# Patient Record
Sex: FEMALE | Race: OTHER | Employment: UNEMPLOYED | URBAN - METROPOLITAN AREA
[De-identification: names, ages, dates, MRNs, and addresses within clinical notes are randomized per-mention and may not be internally consistent; named-entity substitution may affect disease eponyms.]

---

## 2019-09-13 ENCOUNTER — INITIAL PRENATAL (OUTPATIENT)
Dept: FAMILY MEDICINE CLINIC | Age: 24
End: 2019-09-13

## 2019-09-13 ENCOUNTER — HOSPITAL ENCOUNTER (OUTPATIENT)
Dept: LAB | Age: 24
Discharge: HOME | End: 2019-09-13
Payer: SUBSIDIZED

## 2019-09-13 VITALS
WEIGHT: 182 LBS | HEART RATE: 94 BPM | TEMPERATURE: 98.2 F | DIASTOLIC BLOOD PRESSURE: 69 MMHG | HEIGHT: 61 IN | OXYGEN SATURATION: 98 % | SYSTOLIC BLOOD PRESSURE: 106 MMHG | RESPIRATION RATE: 16 BRPM | BODY MASS INDEX: 34.36 KG/M2

## 2019-09-13 DIAGNOSIS — Z34.90 ENCOUNTER FOR SUPERVISION OF NORMAL PREGNANCY, ANTEPARTUM, UNSPECIFIED GRAVIDITY: ICD-10-CM

## 2019-09-13 DIAGNOSIS — Z34.90 PREGNANCY, UNSPECIFIED GESTATIONAL AGE: Primary | ICD-10-CM

## 2019-09-13 LAB
ANTIBODY SCREEN, EXTERNAL: NEGATIVE
BILIRUB UR QL STRIP: NEGATIVE
CHLAMYDIA, EXTERNAL: NEGATIVE
GLUCOSE UR-MCNC: NEGATIVE MG/DL
HBSAG, EXTERNAL: NEGATIVE
HIV, EXTERNAL: NORMAL
KETONES P FAST UR STRIP-MCNC: NORMAL MG/DL
N. GONORRHEA, EXTERNAL: NEGATIVE
PH UR STRIP: 6.5 [PH] (ref 4.6–8)
PROT UR QL STRIP: NORMAL
RPR, EXTERNAL: NORMAL
RUBELLA, EXTERNAL: NORMAL
SP GR UR STRIP: 1.02 (ref 1–1.03)
TYPE, ABO & RH, EXTERNAL: NORMAL
UA UROBILINOGEN AMB POC: NORMAL (ref 0.2–1)
URINALYSIS CLARITY POC: NORMAL
URINALYSIS COLOR POC: YELLOW
URINE BLOOD POC: NEGATIVE
URINE LEUKOCYTES POC: NORMAL
URINE NITRITES POC: POSITIVE

## 2019-09-13 PROCEDURE — 88175 CYTOPATH C/V AUTO FLUID REDO: CPT

## 2019-09-13 PROCEDURE — 87491 CHLMYD TRACH DNA AMP PROBE: CPT

## 2019-09-13 NOTE — PROGRESS NOTES
History and Physical    Patient: Hira Mahoney MRN: [id number]  SSN: [id number]    YOB: 1995  Age: 25 y.o. Sex: female      Subjective:      Hira Mahoney is a 25 y.o. female  at Unknown who presents for [address] visit. FOB involved, they live together    States she is late to care because she didn't realize she was pregnant. Was on OCPs at the time but had missed some doses. Children live with her, same FOB for all her children. Was not planning to have another baby but is excited about it. Not taking PNV  States she's been feeling the baby more \"for 3 months\"    Past Medical History:   Diagnosis Date    Asthma     mild intermittent      Past Surgical History:   Procedure Laterality Date    HX  SECTION        History reviewed. No pertinent family history. Social History     Tobacco Use    Smoking status: Never Smoker   Substance Use Topics    Alcohol use: Not Currently      Prior to Admission medications    Not on File        Not on File    Review of Systems:  ROS negative except as noted in HPI. Objective:     Vitals:    19 1300   Resp: 16   Weight: 182 lb (82.6 kg)   Height: 5' 1.02\" (1.55 m)        Physical Exam:  See prenatal physical exam.    Assessment/Plan:   23yo  @ 23w1d by LMP  1. IUP: IOB labs and pap today, will defer dating due to late GA and schedule anatomy. Measuring 32wk. Declined genetic screening. 2.  Late to care: established at 23 wk   3. Hx : x2, will try to get records from last section, will be repeat at 39wk  4.   Asthma: mild intermittent, hasn't had to use inhaler for years     Signed By: Jenna Kamara DO     2019

## 2019-09-14 LAB
ABO GROUP BLD: NORMAL
RH BLD: POSITIVE

## 2019-09-15 LAB
BACTERIA UR CULT: NORMAL
BACTERIA UR CULT: NORMAL

## 2019-09-16 LAB
BASOPHILS # BLD AUTO: 0 X10E3/UL (ref 0–0.2)
BASOPHILS NFR BLD AUTO: 0 %
BLD GP AB SCN SERPL QL: NEGATIVE
EOSINOPHIL # BLD AUTO: 0.1 X10E3/UL (ref 0–0.4)
EOSINOPHIL NFR BLD AUTO: 2 %
ERYTHROCYTE [DISTWIDTH] IN BLOOD BY AUTOMATED COUNT: 14.5 % (ref 12.3–15.4)
EST. AVERAGE GLUCOSE BLD GHB EST-MCNC: 103 MG/DL
HBA1C MFR BLD: 5.2 % (ref 4.8–5.6)
HBV SURFACE AG SERPL QL IA: NEGATIVE
HCT VFR BLD AUTO: 29.6 % (ref 34–46.6)
HGB A MFR BLD: 97.9 % (ref 96.4–98.8)
HGB A2 MFR BLD COLUMN CHROM: 2.1 % (ref 1.8–3.2)
HGB BLD-MCNC: 9.7 G/DL (ref 11.1–15.9)
HGB C MFR BLD: 0 %
HGB F MFR BLD: 0 % (ref 0–2)
HGB FRACT BLD-IMP: NORMAL
HGB OTHER MFR BLD HPLC: 0 %
HGB S BLD QL SOLY: NEGATIVE
HGB S MFR BLD: 0 %
HIV 1+2 AB+HIV1 P24 AG SERPL QL IA: NON REACTIVE
IMM GRANULOCYTES # BLD AUTO: 0.1 X10E3/UL (ref 0–0.1)
IMM GRANULOCYTES NFR BLD AUTO: 1 %
LYMPHOCYTES # BLD AUTO: 1.4 X10E3/UL (ref 0.7–3.1)
LYMPHOCYTES NFR BLD AUTO: 18 %
MCH RBC QN AUTO: 26.1 PG (ref 26.6–33)
MCHC RBC AUTO-ENTMCNC: 32.8 G/DL (ref 31.5–35.7)
MCV RBC AUTO: 80 FL (ref 79–97)
MONOCYTES # BLD AUTO: 0.5 X10E3/UL (ref 0.1–0.9)
MONOCYTES NFR BLD AUTO: 6 %
NEUTROPHILS # BLD AUTO: 6 X10E3/UL (ref 1.4–7)
NEUTROPHILS NFR BLD AUTO: 73 %
PLATELET # BLD AUTO: 246 X10E3/UL (ref 150–450)
RBC # BLD AUTO: 3.72 X10E6/UL (ref 3.77–5.28)
RPR SER QL: NON REACTIVE
RUBV IGG SERPL IA-ACNC: 2.69 INDEX
VZV IGG SER IA-ACNC: 1584 INDEX
WBC # BLD AUTO: 8.1 X10E3/UL (ref 3.4–10.8)

## 2019-09-17 ENCOUNTER — TELEPHONE (OUTPATIENT)
Dept: FAMILY MEDICINE CLINIC | Age: 24
End: 2019-09-17

## 2019-09-17 DIAGNOSIS — Z34.90 PREGNANCY, UNSPECIFIED GESTATIONAL AGE: Primary | ICD-10-CM

## 2019-09-17 LAB
C TRACH RRNA SPEC QL NAA+PROBE: NEGATIVE
N GONORRHOEA RRNA SPEC QL NAA+PROBE: NEGATIVE
SPECIMEN SOURCE: NORMAL

## 2019-09-17 RX ORDER — LANOLIN ALCOHOL/MO/W.PET/CERES
325 CREAM (GRAM) TOPICAL
Qty: 90 TAB | Refills: 6 | Status: SHIPPED | OUTPATIENT
Start: 2019-09-17

## 2019-09-17 NOTE — TELEPHONE ENCOUNTER
Called patient using [employer]  [phone] to inform her of lab results and prescription for iron sent to pharmacy. Patient verbalized understanding.

## 2019-09-17 NOTE — PROGRESS NOTES
RH pos  Started iron for anemia  Sent iron profile+ferritin   Consider tx of persistent lactobacillus if remains high colony count

## 2019-09-19 LAB
FERRITIN SERPL-MCNC: 5 NG/ML (ref 15–150)
IRON SATN MFR SERPL: 6 % (ref 15–55)
IRON SERPL-MCNC: 33 UG/DL (ref 27–159)
SPECIMEN STATUS REPORT, ROLRST: NORMAL
TIBC SERPL-MCNC: 535 UG/DL (ref 250–450)
UIBC SERPL-MCNC: 502 UG/DL (ref 131–425)

## 2019-09-24 ENCOUNTER — HOSPITAL ENCOUNTER (OUTPATIENT)
Dept: PERINATAL CARE | Age: 24
Discharge: HOME | End: 2019-09-24
Attending: OBSTETRICS & GYNECOLOGY
Payer: SUBSIDIZED

## 2019-09-24 PROCEDURE — 76805 OB US >/= 14 WKS SNGL FETUS: CPT | Performed by: OBSTETRICS & GYNECOLOGY

## 2019-10-01 NOTE — PROGRESS NOTES
12/9/19 ([facility] 39 changed by MFM and changed in chart)  Hx c/s x2, placenta anterior   Anatomy normal   Late to care.

## 2019-10-25 ENCOUNTER — ROUTINE PRENATAL (OUTPATIENT)
Dept: FAMILY MEDICINE CLINIC | Age: 24
End: 2019-10-25

## 2019-10-25 VITALS
HEIGHT: 61 IN | DIASTOLIC BLOOD PRESSURE: 73 MMHG | TEMPERATURE: 98.1 F | SYSTOLIC BLOOD PRESSURE: 111 MMHG | RESPIRATION RATE: 16 BRPM | HEART RATE: 77 BPM | BODY MASS INDEX: 34.78 KG/M2 | OXYGEN SATURATION: 99 % | WEIGHT: 184.2 LBS

## 2019-10-25 DIAGNOSIS — Z3A.33 33 WEEKS GESTATION OF PREGNANCY: Primary | ICD-10-CM

## 2019-10-25 LAB
BILIRUB UR QL STRIP: NORMAL
GLUCOSE UR-MCNC: NEGATIVE MG/DL
KETONES P FAST UR STRIP-MCNC: NORMAL MG/DL
PH UR STRIP: 5.5 [PH] (ref 4.6–8)
PROT UR QL STRIP: NORMAL
SP GR UR STRIP: 1.03 (ref 1–1.03)
UA UROBILINOGEN AMB POC: NORMAL (ref 0.2–1)
URINALYSIS CLARITY POC: NORMAL
URINALYSIS COLOR POC: YELLOW
URINE BLOOD POC: NEGATIVE
URINE LEUKOCYTES POC: NORMAL
URINE NITRITES POC: NEGATIVE

## 2019-10-25 NOTE — PROGRESS NOTES
RETURN OB VISIT     Subjective:   Timothy Holder is a G5  who presents at 33w4d for routine prenatal visit. She feels well and is without complaints. She is eating a balanced diet. She is taking her prenatal vitamins. She is taking iron tid with or without food. No constipation or SEs  She is drinking 8+ cups of water daily. She is feeling her baby move. She denies vaginal bleeding, discharge or loss of fluid. She denies nausea, vomiting, severe abdominal pain or cramping. She denies dysuria. Current Outpatient Medications on File Prior to Visit   Medication Sig Dispense Refill    ferrous sulfate 325 mg (65 mg iron) tablet Take 1 Tab by mouth three (3) times daily (with meals). 90 Tab 6     No current facility-administered medications on file prior to visit. Objective:     Visit Vitals  /73   Pulse 77   Temp 98.1 °F (36.7 °C) (Oral)   Resp 16   Ht 5' 1.02\" (1.55 m)   Wt 184 lb 3.2 oz (83.6 kg)   LMP 04/04/2019   SpO2 99%   BMI 34.78 kg/m²     Physical Exam:  GENERAL APPEARANCE: alert, well appearing, in no apparent distress, well hydrated  HEAD: normocephalic, atraumatic  Lungs:  No respiratory distress  HEART: regular rate and rhythm, no murmurs  ABDOMEN: soft, nontender,Fundus soft, nontender and measuring 33 cm.  FHT present @ 135 bpm.  BACK: no CVA tenderness  EXTREMITIES: no redness or tenderness in the calves or thighs, no edema  NEUROLOGICAL: alert, oriented, normal speech, no focal findings or movement disorder noted  SKIN: normal coloration and turgor, no rashes    Labs:  Results for orders placed or performed in visit on 10/25/19   AMB POC URINALYSIS DIP STICK AUTO W/O MICRO     Status: None   Result Value Ref Range Status    Color (UA POC) Yellow  Final    Clarity (UA POC) Slightly Cloudy  Final    Glucose (UA POC) Negative Negative Final    Bilirubin (UA POC) 1+ Negative Final    Ketones (UA POC) 1+ Negative Final    Specific gravity (UA POC) 1.030 1.001 - 1.035 Final    Blood (UA POC) Negative Negative Final    pH (UA POC) 5.5 4.6 - 8.0 Final    Protein (UA POC) 1+ Negative Final    Urobilinogen (UA POC) 0.2 mg/dL 0.2 - 1 Final    Nitrites (UA POC) Negative Negative Final    Leukocyte esterase (UA POC) Trace Negative Final         Assessment/ Plan:   Perez Cantor is a 25 y.o.  at 33w4d by 29w1d sono not c/w LMP with Estimated Date of Delivery: 19. Complicated by: Late presentation to [facility], prior CD x 2, h/o asthma  Blood type O +  PNLs wnl  Pt declined genetic testing  Pap NILM (2019)  Gc/Chlam neg  UA with 1+ bili, ketones, protein - suspect dehydration and discussed importance of drinking 8-10+ cups of water daily    1. IUP: Presented too late to [facility], dated by 3rd trimester US with TELMA 19. Declined genetic screening. 1hr GTT and fu CBC ordered, pt will return for lab appt on Mon 10/28. Tdap given today. GBS screening at next appt. 2. H/o  x 2: Plan for repeat c/s at 39 weeks. Will try to obtain records of prior deliveries. 3. H/o asthma: stable, no inhaler use in years    Diagnoses and all orders for this visit:    1. 33 weeks gestation of pregnancy  -     AMB POC URINALYSIS DIP STICK AUTO W/O MICRO  -     TETANUS, DIPHTHERIA TOXOIDS AND ACELLULAR PERTUSSIS VACCINE (TDAP), IN INDIVIDS. >=7, IM  -     GLUCOSE, GESTATIONAL 1 HR TOLERANCE; Future  -     CBC WITH AUTOMATED DIFF       Follow-up and Dispositions    · Return in about 2 weeks (around 2019) for Routine PNC (and lab appointment on 10/28/19). I have discussed the diagnosis with the patient and the intended plan as seen in the above orders. We reviewed labor precautions and reasons to call the office (decreased fetal movement, vaginal bleeding, leakage of fluid, contractions, or any other concerns.) AVS was printed, given to patient and briefly discussed prior to patient's departure from the office today.     Katherine Trimble MD  [facility] [address] [facility] [address]

## 2019-10-25 NOTE — PATIENT INSTRUCTIONS
Semanas 32 a 34 de hopper embarazo: Instrucciones de cuidado - [ Philis Contras 32 to 29 of Your Pregnancy: Care Instructions ]  Instrucciones de cuidado    Alfredito las últimas semanas de hopper tiffany Murdock podría sentir más siddhartha y [place]. Es importante que descanse cuando pueda. Hopper bebé en crecimiento está ejerciendo más presión sobre hopper vejiga. Por eso, kristin vez necesite orinar con más frecuencia. Las hemorroides también son comunes. Estas son venas en la rich del recto que causan dolor y comezón. En la semana 36, a la mayoría de las [employer] un análisis para detectar el estreptococo del shawn B (GBS, por sahil siglas en inglés). El GBS es alicia bacteria común que puede vivir en la vagina y el recto. Podría enfermar a hopper bebé después del nacimiento. Si el [employer] positivo, le darán antibióticos alfredito el [place] de [place]. Estos prevendrán que el bebé se contagie con la bacteria. Podría convenirle hablar con hopper médico sobre poner en un banco la ricci del cordón umbilical de hopper bebé. Esta es la ricci que queda en el cordón después del nacimiento. Si desea guardar esta ricci, debe hacer los trámites necesarios con anticipación. No puede decidirlo en el último minuto. Si todavía no le calero aplicado la vacuna Tdap (tétanos, difteria y tos [place]) alfredito angela Mathieu, hable con hopper médico acerca de aplicársela. Curry Riki a proteger a hopper recién nacido contra la infección por tos ferina. La atención de seguimiento es alicia parte clave de hopper tratamiento y seguridad. Asegúrese de hacer y acudir a todas las citas, y llame a hopper médico si está teniendo problemas. También es alicia buena idea saber los resultados de sahil exámenes y mantener alicia lista de los medicamentos que mt. ¿Cómo puede cuidarse en el hogar? Alivio de las hemorroides  · Aumente en hopper dieta la cantidad de líquidos, frutas, verduras y Ava. [place] ayudará a Yifan Ahmadi. · Evite estar sentada alfredito demasiado tiempo.  Recuéstese sobre el lado anselmo varias veces al día. · Límpiese con papel higiénico suave y húmedo. O puede utilizar compresas de infusión de hamamelis [facility] (\"witch hazel\") o toallas de higiene personal.  · Si tiene malestar, pruebe a usar compresas de hielo. O puede hacer vida de asiento tibios. Hágalos toribio 20 minutos por vez, según lo necesite. · Use alicia crema con hidrocortisona para el dolor y la picazón. Alexandra Lingo son Anusol y Preparation H Hydrocortisone. · Pregúntele a haque médico si puede emilie un ablandador de heces de venta zara. Considere el amamantamiento  · Los expertos recomiendan que las mujeres amamanten por Mayank Forman. La leche materna es el mejor alimento para los bebés. · A los bebés les resulta más fácil digerir la [place] materna que la [place] de [place]. Y siempre está disponible, tiene la temperatura ideal y es gratuita. · [place] ayudar a proteger a haque bebé de algunos problemas de kaley. En comparación con los bebés alimentados con leche de [place], los bebés que se alimentan con leche materna tienen menos probabilidades de:  ? Fide  infecciones de oído, resfriados, diarrea y neumonía. ? Ser obesos o tener diabetes más adelante en haque amy. · [employer] a jami bebés tienen menos sangrado después del [place]. Jami úteros también recobran haque tamaño normal más rápido. · Algunas mujeres que amamantan bajan de Fernando rápido. Elaborar leche quema calorías. · El amamantamiento puede disminuir el riesgo de tener cáncer de seno (mama), cáncer de ovario y osteoporosis. Decida sobre la circuncisión para los niños  · Al emilie esta decisión, podría ser de ayuda pensar en jami tradiciones personales, religiosas y familiares. Es haque decisión si desea conservar el pene de haque hijo natural o circuncidar a haque hijo. · Si decide que le gustaría que circuncidaran a haque bebé, hable con haque médico. Discuta cualquier inquietud que tenga sobre el dolor.  También puede hablar acerca de jami preferencias para la anestesia. ¿Dónde puede encontrar más información en inglés? Briseyda Bonds a [email or web address]-zeb.info/. Diego Mirki L204 en la búsqueda para aprender más acerca de \"Semanas 28 a 29 de hopper embarazo: Instrucciones de cuidado - [ Javon Fitting 32 to 29 of Your Pregnancy: Care Instructions ]. \"  Revisado: 29 boogie, 2019  Versión del contenido: 12.2  © 0332-9096 Healthwise, Incorporated. Las instrucciones de cuidado fueron adaptadas bajo licencia por [employer] (which disclaims liability or warranty for this information). Si usted tiene [place] afección médica o sobre estas instrucciones, siempre pregunte a hopper profesional de kaley. Healthwise, Incorporated niega toda garantía o responsabilidad por hopper uso de esta información. Niko Vanessa a hopper médico toribio el embarazo (después de 20 semanas) - [ Learning About When to Call Your Doctor During Pregnancy (After 20 Weeks) ]  Instrucciones de cuidado  Es normal que tenga inquietudes acerca de lo que podría ser un problema toribio el Jasmin Delay. Aunque la mayoría de las mujeres embarazadas no tienen ningún problema grave, es importante saber cuándo llamar a hopper médico si tiene determinados síntomas o señales de trabajo de [place]. Estas son algunas sugerencias generales. Hopper médico puede darle más información sobre cuándo llamar. Cuándo llamar a hopper médico (después de 20 semanas)  Llame al 911 en cualquier momento que considere que necesita atención de [place]. Por ejemplo, llame si:  · Tiene sangrado vaginal intenso. · Tiene dolor repentino e intenso en el abdomen. · Se desmayó (perdió el conocimiento). · Tiene alicia convulsión. · Ve o siente el cordón umbilical.  · Leticia que está a punto de preston a ray a hopper bebé y no puede llegar en forma hester al hospital.  Susana Hair a hopper médico ahora mismo o busque atención médica inmediata si:  · Tiene sangrado vaginal.  · Tiene dolor en el abdomen. · Tiene fiebre.   · Tiene síntomas de preeclampsia, michelle:  ? Hinchazón repentina de la lizbet, las waldemar o los pies. ? Nuevos problemas de visión (michelle oscurecimiento, claudia borroso o claudia puntos). ? Dolor de lorne intenso. · Tiene alicia pérdida repentina de líquido por la vagina. (Piensa que rompió la ewelina). · Piensa que puede avelino comenzado el [place] de [place]. [place] significa que ha tenido al menos 6 contracciones en [employer]. · Nota que haque bebé ha dejado de moverse o lo hace mucho menos de lo habitual.  · Tiene síntomas de alicia infección urinaria. Estos pueden incluir:  ? Dolor o ardor al orinar. ? Necesidad de orinar con frecuencia sin poder eliminar mucha orina. ? Dolor en el flanco, que se encuentra annelise debajo de la caja torácica y [place] de la cintura en un lado de la espalda. ? [employer]. Preste especial atención a los cambios en haque kaley y asegúrese de comunicarse con haque médico si:  · Tiene flujo vaginal con un olor desagradable. · Tiene cambios en la piel, tales michelle:  ? Salpullido. ? Comezón. ? Color amarillento en la piel. · Tiene otras inquietudes acerca de haque embarazo. Si tiene signos de trabajo de parto al llegar a las [address] o más  Si tiene señales de [place] de parto a las 37 semanas o [place], es posible que haque médico le diga que llame cuando haque trabajo de parto se vuelva más [place]. Los síntomas del trabajo de parto activo incluyen:  · Contracciones que son regulares. · Contracciones a intervalos de menos de 5 minutos. · Contracciones toribio las cuales es difícil hablar. La atención de seguimiento es alicia parte clave de haque tratamiento y seguridad. Asegúrese de hacer y acudir a todas las citas, y llame a haque médico si está teniendo problemas. También es alicia buena idea saber los resultados de sahil exámenes y mantener alicia lista de los medicamentos que mt. ¿Dónde puede encontrar más información en inglés? Aimee Denzel a [email or web address]-zeb.[email or web address]   Escriba N531 en la búsqueda para aprender Vicente Gentle de \"Aprenda cuándo llamar a haque médico toribio el embarazo (después de 20 semanas) - [ Learning About When to Call Your Doctor During Pregnancy (After 20 Weeks) ]. \"  Revisado: 29 boogie, 2019  Versión del contenido: 12.2  © 6721-9351 Healthwise, Incorporated. Las instrucciones de cuidado fueron adaptadas bajo licencia por [employer]ctions (which disclaims liability or warranty for this information). Si usted tiene [place] afección médica o sobre estas instrucciones, siempre pregunte a haque profesional de kaley. Healthwise, Incorporated niega toda garantía o responsabilidad por haque uso de esta información. Aprenda sobre las pruebas de glucosa toribio Yolanda Cordial - [ Learning About Glucose Testing During Pregnancy ]  ¿Qué es alicia prueba de glucosa? Alicia prueba de glucosa mide la capacidad del cuerpo para utilizar un tipo de azúcar llamado glucosa. La glucosa es la ewelina principal de energía del cuerpo. Esta prueba se [place] para detectar diabetes gestacional en mujeres embarazadas. La diabetes gestacional es alicia forma de diabetes que se desarrolla toribio el embarazo y generalmente desaparece después de que nace el bebé. Cuando tiene [place] [employer], la insulina de haque cuerpo no puede mantener el azúcar en la ricci dentro de intervalos normales. Si no controla el azúcar en la ricci, haque bebé puede crecer demasiado y podría tener problemas después de nacer. ¿Cómo se hace alciia prueba de glucosa? Hay diferentes maneras de detectar la diabetes gestacional.  Pascual de los métodos se hace en dos pasos. 1. No es necesario que deje de comer ni beber antes del primer paso. Usted beberá un líquido que contiene 50 gramos de azúcar (glucosa). Se le tomará alicia muestra de ricci 1 hora después.  Si no tiene alicia cantidad excesiva de azúcar en la ricci, no tiene diabetes gestacional.  2. Si en efecto tiene alicia cantidad excesiva de azúcar en la ricci, se le pedirá que morales el kevin paso, la prueba oral de tolerancia a la glucosa (OGTT, por sahil siglas en inglés). Para la OGTT, usted no puede comer ni beber nada toribio un mínimo de 8 horas antes de la prueba. Luego, se le mt alicia [place] de ricci cuando llega para hacerse la prueba. Fany es haque valor de la glucosa en la ricci en [place]. Proporciona alicia base de comparación para otros valores de la glucosa. Usted fawn un líquido que contiene 100 gramos de azúcar (glucosa). Se le tomará Karen Tom de ricci 3 horas más tarde para claudia cuánto azúcar tiene [employer]. Si no tiene alicia cantidad excesiva de azúcar en la ricci, no tiene diabetes gestacional. Si en efecto tiene alicia cantidad excesiva de azúcar en la ricci, entonces usted puede tener diabetes gestacional.  Otro método diferente se realiza en un solo paso. Es otra versión de la OGTT. Usted no puede comer ni beber nada toribio un mínimo de 8 horas antes de la prueba. Luego, se le mt alicia [place] de ricci cuando llega para hacerse la prueba. Fany es haque valor de la glucosa en la ricci en [place]. Proporciona alicia base de comparación para otros valores de la glucosa. Usted fawn un líquido que contiene 75 gramos de azúcar (glucosa). Se le tomará alicia muestra de ricci al cabo de 1 hora y luego 2 horas más tarde para claudia cuánto azúcar tiene en la ricci. Si no tiene alicia cantidad excesiva de azúcar en la ricci, no tiene diabetes gestacional. Si en efecto tiene alicia cantidad excesiva de azúcar en la ricci, entonces usted puede tener diabetes gestacional.  ¿Qué más debe saber sobre la prueba? El nivel de glucosa en la ricci podría bajar mucho hacia el final de la prueba de glucosa. Si esto sucede, podría sentirse débil, hambrienta e inquieta. Informe a haque médico si tiene estos síntomas. Por lo general, la prueba se suspendería. Es posible que vomite después de beber el líquido [place]. Si esto sucede, puede ser necesario repetir la prueba otro día.   Haque médico puede hacerle más pruebas de glucosa en diferentes momentos toribio Brenna Cortez. La atención de seguimiento es alicia parte clave de haque tratamiento y seguridad. Asegúrese de hacer y acudir a todas las citas, y llame a haque médico si está teniendo problemas. También es alicia buena idea saber los resultados de sahil exámenes y mantener alicia lista de los medicamentos que mt. ¿Dónde puede encontrar más información en inglés? Benigno Rose a [email or web address]-[email or web address].info/. Escriba E789 en la búsqueda para aprender más acerca de \"Aprenda sobre las pruebas de glucosa toribio el Gene Gary - [ Learning About Glucose Testing During Pregnancy ]. \"  Revisado: 16 katerina, 2019  Versión del contenido: 12.2  © 8199-1812 Healthwise, Incorporated. Las instrucciones de cuidado fueron adaptadas bajo licencia por [employer] Help Connections (which disclaims liability or warranty for this information). Si usted tiene [place] afección médica o sobre estas instrucciones, siempre pregunte a haque profesional de kaley. Healthwise, Incorporated niega toda garantía o responsabilidad por haque uso de esta información.

## 2019-10-25 NOTE — PROGRESS NOTES
Chief Complaint   Patient presents with    Routine Prenatal Visit     Blood pressure 111/73, pulse 77, temperature 98.1 °F (36.7 °C), temperature source Oral, resp. rate 16, height 5' 1.02\" (1.55 m), weight 184 lb 3.2 oz (83.6 kg), last menstrual period 04/04/2019, SpO2 99 %. 1. Have you been to the ER, urgent care clinic since your last visit? Hospitalized since your last visit? No    2. Have you seen or consulted any other health care providers outside of the [address] [place] [address] since your last visit? Include any pap smears or colon screening. No       [employer]  ([place]):[id number]      Patient denies any bleeding,cramping or leakage of fluid. + fetal movement.

## 2019-11-11 ENCOUNTER — ROUTINE PRENATAL (OUTPATIENT)
Dept: FAMILY MEDICINE CLINIC | Age: 24
End: 2019-11-11

## 2019-11-11 ENCOUNTER — HOSPITAL ENCOUNTER (OUTPATIENT)
Dept: LAB | Age: 24
Discharge: HOME | End: 2019-11-11

## 2019-11-11 VITALS
BODY MASS INDEX: 35.12 KG/M2 | SYSTOLIC BLOOD PRESSURE: 110 MMHG | DIASTOLIC BLOOD PRESSURE: 67 MMHG | TEMPERATURE: 99 F | OXYGEN SATURATION: 97 % | RESPIRATION RATE: 16 BRPM | HEIGHT: 61 IN | WEIGHT: 186 LBS | HEART RATE: 77 BPM

## 2019-11-11 DIAGNOSIS — Z3A.33 33 WEEKS GESTATION OF PREGNANCY: ICD-10-CM

## 2019-11-11 DIAGNOSIS — Z3A.36 36 WEEKS GESTATION OF PREGNANCY: ICD-10-CM

## 2019-11-11 DIAGNOSIS — Z3A.36 36 WEEKS GESTATION OF PREGNANCY: Primary | ICD-10-CM

## 2019-11-11 LAB
BILIRUB UR QL STRIP: NORMAL
GLUCOSE UR-MCNC: NEGATIVE MG/DL
GRBS, EXTERNAL: NEGATIVE
GTT, 1 HR, GLUCOLA, EXTERNAL: 84
KETONES P FAST UR STRIP-MCNC: NEGATIVE MG/DL
PH UR STRIP: 5.5 [PH] (ref 4.6–8)
PROT UR QL STRIP: NORMAL
SP GR UR STRIP: 1.03 (ref 1–1.03)
UA UROBILINOGEN AMB POC: NORMAL (ref 0.2–1)
URINALYSIS CLARITY POC: NORMAL
URINALYSIS COLOR POC: YELLOW
URINE BLOOD POC: NEGATIVE
URINE LEUKOCYTES POC: NEGATIVE
URINE NITRITES POC: NEGATIVE

## 2019-11-11 NOTE — PROGRESS NOTES
Return OB Visit   No concerns today. Objective:   /67 (BP 1 Location: Left arm, BP Patient Position: Sitting)   Pulse 77   Temp 99 °F (37.2 °C) (Oral)   Resp 16   Ht 5' 1.02\" (1.55 m)   Wt 186 lb (84.4 kg)   LMP 2019   SpO2 97%   Breastfeeding? No   BMI 35.12 kg/m²  Estimated Date of Delivery: 19      See flowsheet   Assessment       ICD-10-CM ICD-9-CM    1. 36 weeks gestation of pregnancy Z3A.36 V22.2 AMB POC URINALYSIS DIP STICK AUTO W/O MICRO     Plan   23yo  @ 36w0d by 29 wk scan  1. IUP: RH pos, Declined genetic screening. Anatomy normal, has still not done GTT and repeat CBC so getting today, GBS collected. 2.  Late to care and sporadic care: established around 28 wk, dated by 29wk scan. 3.  Hx : x2, will be repeat but will push it to 40wk given poor dating criteria - scheduled  at 9:30, pt to arrive at 7:30 fasting, Jerry Knee and pt aware. Labor precautions discussed. 4.  Asthma: mild intermittent, hasn't had to use inhaler for years     Orders Placed This Encounter    AMB POC URINALYSIS DIP STICK AUTO W/O MICRO    PNV No12-Iron-FA-DSS-OM-3 29 mg iron-1 mg -50 mg CPKD     Sig: Take  by mouth.          Jose Daniel Renee, DO

## 2019-11-11 NOTE — PROGRESS NOTES
Chief Complaint   Patient presents with    Routine Prenatal Visit     36w0d    Leakage of Fluid: NO  Vaginal Bleeding: NO  Fetal Movement: YES  Prenatal vitamins: YES  Having Contractions: NO  Pain: NO    Visit Vitals  /67 (BP 1 Location: Left arm, BP Patient Position: Sitting)   Pulse 77   Temp 99 °F (37.2 °C) (Oral)   Resp 16   Ht 5' 1.02\" (1.55 m)   Wt 186 lb (84.4 kg)   LMP 04/04/2019   SpO2 97%   BMI 35.12 kg/m²     Due for GTT: 3:05

## 2019-11-13 LAB
BASOPHILS # BLD AUTO: 0 X10E3/UL (ref 0–0.2)
BASOPHILS NFR BLD AUTO: 0 %
EOSINOPHIL # BLD AUTO: 0.2 X10E3/UL (ref 0–0.4)
EOSINOPHIL NFR BLD AUTO: 3 %
ERYTHROCYTE [DISTWIDTH] IN BLOOD BY AUTOMATED COUNT: 16 % (ref 12.3–15.4)
GLUCOSE 1H P 50 G GLC PO SERPL-MCNC: 84 MG/DL (ref 65–139)
HCT VFR BLD AUTO: 31.1 % (ref 34–46.6)
HGB BLD-MCNC: 9.6 G/DL (ref 11.1–15.9)
IMM GRANULOCYTES # BLD AUTO: 0.1 X10E3/UL (ref 0–0.1)
IMM GRANULOCYTES NFR BLD AUTO: 1 %
LYMPHOCYTES # BLD AUTO: 1.6 X10E3/UL (ref 0.7–3.1)
LYMPHOCYTES NFR BLD AUTO: 19 %
MCH RBC QN AUTO: 24.6 PG (ref 26.6–33)
MCHC RBC AUTO-ENTMCNC: 30.9 G/DL (ref 31.5–35.7)
MCV RBC AUTO: 80 FL (ref 79–97)
MONOCYTES # BLD AUTO: 0.5 X10E3/UL (ref 0.1–0.9)
MONOCYTES NFR BLD AUTO: 6 %
NEUTROPHILS # BLD AUTO: 6.2 X10E3/UL (ref 1.4–7)
NEUTROPHILS NFR BLD AUTO: 71 %
PLATELET # BLD AUTO: 227 X10E3/UL (ref 150–450)
RBC # BLD AUTO: 3.9 X10E6/UL (ref 3.77–5.28)
WBC # BLD AUTO: 8.6 X10E3/UL (ref 3.4–10.8)

## 2019-11-13 NOTE — PROGRESS NOTES
I called pt to inform her of results:    1hr GTT wnl, done at 36 wks GA    CBC with persistent anemia, hgb unchanged from 2 mo ago at 9.6. Is SAHIL based on 9/13 iron profile/ferritin. Pt reports she has been taking iron tid. She reports some occasional dyspnea, but denies CP, dizziness, palpitations, fatigue. Denies bleeding. I advised her to continue taking TID with non-milk products.    Will consider adding vit C to take with iron, will discuss at fu on 11/18  Will plan to repeat CBC in 2-3 weeks if not delivered to ensure not worsening

## 2019-11-15 LAB
BACTERIA SPEC CULT: NORMAL
SERVICE CMNT-IMP: NORMAL

## 2019-11-21 ENCOUNTER — ROUTINE PRENATAL (OUTPATIENT)
Dept: FAMILY MEDICINE CLINIC | Age: 24
End: 2019-11-21

## 2019-11-21 VITALS
OXYGEN SATURATION: 98 % | HEART RATE: 94 BPM | RESPIRATION RATE: 16 BRPM | HEIGHT: 61 IN | DIASTOLIC BLOOD PRESSURE: 67 MMHG | BODY MASS INDEX: 35.12 KG/M2 | SYSTOLIC BLOOD PRESSURE: 107 MMHG | TEMPERATURE: 99.2 F | WEIGHT: 186 LBS

## 2019-11-21 DIAGNOSIS — O99.013 ANEMIA DURING PREGNANCY IN THIRD TRIMESTER: ICD-10-CM

## 2019-11-21 DIAGNOSIS — Z98.891 HISTORY OF C-SECTION: ICD-10-CM

## 2019-11-21 DIAGNOSIS — J45.20 MILD INTERMITTENT ASTHMA WITHOUT COMPLICATION: ICD-10-CM

## 2019-11-21 DIAGNOSIS — Z3A.37 37 WEEKS GESTATION OF PREGNANCY: Primary | ICD-10-CM

## 2019-11-21 LAB
BILIRUB UR QL STRIP: NEGATIVE
GLUCOSE UR-MCNC: NEGATIVE MG/DL
KETONES P FAST UR STRIP-MCNC: NEGATIVE MG/DL
PH UR STRIP: 6 [PH] (ref 4.6–8)
PROT UR QL STRIP: NORMAL
SP GR UR STRIP: 1.02 (ref 1–1.03)
UA UROBILINOGEN AMB POC: NORMAL (ref 0.2–1)
URINALYSIS CLARITY POC: CLEAR
URINALYSIS COLOR POC: YELLOW
URINE BLOOD POC: NEGATIVE
URINE LEUKOCYTES POC: NEGATIVE
URINE NITRITES POC: NEGATIVE

## 2019-11-21 NOTE — PROGRESS NOTES
Return OB Visit       Subjective:   Amber Martin 25 y.o.  at 37w3d by 29 wk scan. TELMA: 2019. States she is doing well. No fluid leakage or vaginal bleeding. Positive fetal movement. Taking PNV and Iron supplements. Eating well and drinking fluids. Pregnancy complicated by h/o of maternal obesity, anemia, late to care and previous . : # [id number]    Past Medical History - Reviewed today  Patient Active Problem List   Diagnosis Code    Anemia during pregnancy in third trimester O99.013    H/O:  Z98.891         Medications - Reviewed today  Current Outpatient Medications   Medication Sig Dispense Refill    PNV No12-Iron-FA-DSS-OM-3 29 mg iron-1 mg -50 mg CPKD Take  by mouth.  ferrous sulfate 325 mg (65 mg iron) tablet Take 1 Tab by mouth three (3) times daily (with meals). 90 Tab 6         Allergies - Reviewed today  No Known Allergies      Family History - Reviewed today  History reviewed. No pertinent family history.       Social History - Reviewed today  Social History     Socioeconomic History    Marital status: SINGLE     Spouse name: Not on file    Number of children: Not on file    Years of education: Not on file    Highest education level: Not on file   Occupational History    Not on file   Social Needs    Financial resource strain: Not on file    Food insecurity:     Worry: Not on file     Inability: Not on file    Transportation needs:     Medical: Not on file     Non-medical: Not on file   Tobacco Use    Smoking status: Never Smoker    Smokeless tobacco: Never Used   Substance and Sexual Activity    Alcohol use: Not Currently    Drug use: Never    Sexual activity: Yes   Lifestyle    Physical activity:     Days per week: Not on file     Minutes per session: Not on file    Stress: Not on file   Relationships    Social connections:     Talks on phone: Not on file     Gets together: Not on file     Attends Zoroastrianism service: Not on file     Active member of club or organization: Not on file     Attends meetings of clubs or organizations: Not on file     Relationship status: Not on file    Intimate partner violence:     Fear of current or ex partner: Not on file     Emotionally abused: Not on file     Physically abused: Not on file     Forced sexual activity: Not on file   Other Topics Concern    Not on file   Social History Narrative    Not on file         Health Maintenance - Reviewed today   Immunizations:     -Influenza: done (19)     -Tdap: done (10/25/2019       Objective:     Visit Vitals  /67 (BP 1 Location: Left arm, BP Patient Position: Sitting)   Pulse 94   Temp 99.2 °F (37.3 °C) (Oral)   Resp 16   Ht 5' 1.02\" (1.55 m)   Wt 186 lb (84.4 kg)   LMP 2019   SpO2 98%   Breastfeeding No   BMI 35.12 kg/m²       Physical Exam:  GENERAL APPEARANCE: alert, well appearing, in no apparent distress  LUNGS: clear to auscultation, no wheezes, rales or rhonchi, symmetric air entry  HEART: regular rate and rhythm, no murmurs  ABDOMEN: FHT present, 145 bpm  BACK: no CVA tenderness  UTERUS: gravid, 36 cm  EXTREMITIES: no redness or tenderness in the calves or thighs, no edema  EXTERNAL GENITALIA: normal appearing vulva with no masses, tenderness or lesions  VAGINA: no abnormal discharge or lesions  CERVIX: no lesions or cervical motion tenderness. Cervix is closed, high and firm. Exam chaperoned by [facility] DAVID    POC Office US:              Assessment   SIUP at  37w3d       ICD-10-CM ICD-9-CM    1. 37 weeks gestation of pregnancy Z3A.37 V22.2 AMB POC URINALYSIS DIP STICK AUTO W/O MICRO   2. Mild intermittent asthma without complication Y30.42 357.04    3. Anemia during pregnancy in third trimester O99.013 648.23    4.  History of  Z98.891 V45.89          Plan     PNL: RH pos, antibody negative, HIV neg, HBV negative, VZV immune, neg GC/Ch, RPR non reactive, Declined genetic screening, GBS negative    1. 37 weeks gestation of pregnancy    - U/A showed 1+ protein, otherwise unremarkable  - GBS negative  - normal anatomy scan  - Ultrasound to confirm fetal positioning - vertex at today's visit (19)   - Cervical checks at each visit - closed, findings not supervised by attending.  - Education in preparation for labor and delivery  - RTC on  for routine OB visit with Dr. Zena Russ    2. Mild intermittent asthma without complication  -well controlled, not on any medication    3. Anemia during pregnancy in third trimester  -cont Iron with bowel regimen    4. History of  x2:  - Scheduled for repeat  on  at 9:30, pt to arrive at 7:30 fasting, Dr. Larry Koo and pt aware. Labor precautions discussed, including: Regular painful contractions, lasting for greater than one hour, taking your breath away; any vaginal bleeding; any leakage of fluid; or absent or decreased fetal movement. Call M.D. on call if any of these symptoms or signs occur. I have discussed the diagnosis with the patient and the intended plan as seen in the above orders. The patient has received an after-visit summary and questions were answered concerning future plans. I have discussed medication side effects and warnings with the patient as well.     Patient discussed with Dr. Ayush Garcia (supervising provider)    Macrina Nathan MD  Family Medicine Resident

## 2019-11-21 NOTE — PROGRESS NOTES
I reviewed with the resident the medical history and the resident's findings on the physical examination. I discussed with the resident the patient's diagnosis and concur with the plan. Andrea Bull is a 25 y.o. female  at 37w3d. presents for routine PNC. 2019, by Ultrasound  Concerns addressed: Routine PNC  Pregnancy complicated by:  Anemia, asthma, h/o  x2, maternal obesity  Denies VB, LOF, Contractions. + Fetal movement. Weight gain reviewed.   RTC in 1 week    Visit Vitals  /67 (BP 1 Location: Left arm, BP Patient Position: Sitting)   Pulse 94   Temp 99.2 °F (37.3 °C) (Oral)   Resp 16   Ht 5' 1.02\" (1.55 m)   Wt 186 lb (84.4 kg)   LMP 2019   SpO2 98%   Breastfeeding No   BMI 35.12 kg/m²     FHT: 145  FH: 36    Recent Results (from the past 24 hour(s))   AMB POC URINALYSIS DIP STICK AUTO W/O MICRO    Collection Time: 19 10:54 AM   Result Value Ref Range    Color (UA POC) Yellow     Clarity (UA POC) Clear     Glucose (UA POC) Negative Negative    Bilirubin (UA POC) Negative Negative    Ketones (UA POC) Negative Negative    Specific gravity (UA POC) 1.025 1.001 - 1.035    Blood (UA POC) Negative Negative    pH (UA POC) 6.0 4.6 - 8.0    Protein (UA POC) 1+ Negative    Urobilinogen (UA POC) 0.2 mg/dL 0.2 - 1    Nitrites (UA POC) Negative Negative    Leukocyte esterase (UA POC) Negative Negative

## 2019-11-21 NOTE — PROGRESS NOTES
Chief Complaint   Patient presents with    Routine Prenatal Visit     37w3d    Leakage of Fluid: NO  Vaginal Bleeding: NO  Fetal Movement: YES  Prenatal vitamins: YES  Having Contractions: NO  Pain: NO    Visit Vitals  /67 (BP 1 Location: Left arm, BP Patient Position: Sitting)   Pulse 94   Temp 99.2 °F (37.3 °C) (Oral)   Resp 16   Ht 5' 1.02\" (1.55 m)   Wt 186 lb (84.4 kg)   LMP 04/04/2019   SpO2 98%   Breastfeeding No   BMI 35.12 kg/m²     Immunization History   Administered Date(s) Administered    Influenza Vaccine (Quad) PF 09/13/2019    Tdap 10/25/2019

## 2019-11-25 ENCOUNTER — ANESTHESIA EVENT (OUTPATIENT)
Dept: LABOR AND DELIVERY | Age: 24
DRG: 540 | End: 2019-11-25
Payer: MEDICAID

## 2019-11-25 ENCOUNTER — ANESTHESIA (OUTPATIENT)
Dept: LABOR AND DELIVERY | Age: 24
DRG: 540 | End: 2019-11-25
Payer: MEDICAID

## 2019-11-25 ENCOUNTER — HOSPITAL ENCOUNTER (INPATIENT)
Age: 24
LOS: 3 days | Discharge: HOME | DRG: 540 | End: 2019-11-28
Attending: FAMILY MEDICINE | Admitting: OBSTETRICS & GYNECOLOGY
Payer: MEDICAID

## 2019-11-25 ENCOUNTER — HOSPITAL ENCOUNTER (OUTPATIENT)
Dept: PERINATAL CARE | Age: 24
Discharge: HOME | DRG: 540 | End: 2019-11-25
Attending: OBSTETRICS & GYNECOLOGY
Payer: MEDICAID

## 2019-11-25 DIAGNOSIS — Z98.891 H/O: C-SECTION: Primary | ICD-10-CM

## 2019-11-25 PROBLEM — Z34.90 PREGNANCY: Status: ACTIVE | Noted: 2019-11-25

## 2019-11-25 LAB
A1 MICROGLOB PLACENTAL VAG QL: POSITIVE
ABO + RH BLD: NORMAL
ALBUMIN SERPL-MCNC: 3.6 G/DL (ref 3.5–5)
ALBUMIN/GLOB SERPL: 0.8 {RATIO} (ref 1.1–2.2)
ALP SERPL-CCNC: 152 U/L (ref 45–117)
ALT SERPL-CCNC: 16 U/L (ref 12–78)
ANION GAP SERPL CALC-SCNC: 11 MMOL/L (ref 5–15)
AST SERPL-CCNC: 14 U/L (ref 15–37)
BASOPHILS # BLD: 0 K/UL (ref 0–0.1)
BASOPHILS NFR BLD: 0 % (ref 0–1)
BILIRUB SERPL-MCNC: 0.6 MG/DL (ref 0.2–1)
BLOOD GROUP ANTIBODIES SERPL: NORMAL
BUN SERPL-MCNC: 8 MG/DL (ref 6–20)
BUN/CREAT SERPL: 14 (ref 12–20)
CALCIUM SERPL-MCNC: 9.1 MG/DL (ref 8.5–10.1)
CHLORIDE SERPL-SCNC: 104 MMOL/L (ref 97–108)
CO2 SERPL-SCNC: 22 MMOL/L (ref 21–32)
CONTROL LINE PRESENT?: NORMAL
CREAT SERPL-MCNC: 0.58 MG/DL (ref 0.55–1.02)
DIFFERENTIAL METHOD BLD: ABNORMAL
EOSINOPHIL # BLD: 0.1 K/UL (ref 0–0.4)
EOSINOPHIL NFR BLD: 1 % (ref 0–7)
ERYTHROCYTE [DISTWIDTH] IN BLOOD BY AUTOMATED COUNT: 21.7 % (ref 11.5–14.5)
EXPIRATION DATE: NORMAL
GLOBULIN SER CALC-MCNC: 4.3 G/DL (ref 2–4)
GLUCOSE SERPL-MCNC: 64 MG/DL (ref 65–100)
HCT VFR BLD AUTO: 38.4 % (ref 35–47)
HGB BLD-MCNC: 11.7 G/DL (ref 11.5–16)
IMM GRANULOCYTES # BLD AUTO: 0.1 K/UL (ref 0–0.04)
IMM GRANULOCYTES NFR BLD AUTO: 1 % (ref 0–0.5)
INTERNAL NEGATIVE CONTROL: NORMAL
KIT LOT NO.: NORMAL
LYMPHOCYTES # BLD: 1.7 K/UL (ref 0.8–3.5)
LYMPHOCYTES NFR BLD: 17 % (ref 12–49)
MCH RBC QN AUTO: 25.5 PG (ref 26–34)
MCHC RBC AUTO-ENTMCNC: 30.5 G/DL (ref 30–36.5)
MCV RBC AUTO: 83.8 FL (ref 80–99)
MONOCYTES # BLD: 0.6 K/UL (ref 0–1)
MONOCYTES NFR BLD: 6 % (ref 5–13)
NEUTS SEG # BLD: 7.4 K/UL (ref 1.8–8)
NEUTS SEG NFR BLD: 75 % (ref 32–75)
NRBC # BLD: 0 K/UL (ref 0–0.01)
NRBC BLD-RTO: 0 PER 100 WBC
PLATELET # BLD AUTO: 246 K/UL (ref 150–400)
PMV BLD AUTO: 10.8 FL (ref 8.9–12.9)
POTASSIUM SERPL-SCNC: 3.6 MMOL/L (ref 3.5–5.1)
PROT SERPL-MCNC: 7.9 G/DL (ref 6.4–8.2)
RBC # BLD AUTO: 4.58 M/UL (ref 3.8–5.2)
RBC MORPH BLD: ABNORMAL
RBC MORPH BLD: ABNORMAL
SODIUM SERPL-SCNC: 137 MMOL/L (ref 136–145)
SPECIMEN EXP DATE BLD: NORMAL
WBC # BLD AUTO: 9.9 K/UL (ref 3.6–11)

## 2019-11-25 PROCEDURE — 74011250636 HC RX REV CODE- 250/636: Performed by: FAMILY MEDICINE

## 2019-11-25 PROCEDURE — 74011250636 HC RX REV CODE- 250/636: Performed by: OBSTETRICS & GYNECOLOGY

## 2019-11-25 PROCEDURE — 75410000003 HC RECOV DEL/VAG/CSECN EA 0.5 HR: Performed by: OBSTETRICS & GYNECOLOGY

## 2019-11-25 PROCEDURE — 80053 COMPREHEN METABOLIC PANEL: CPT

## 2019-11-25 PROCEDURE — 36415 COLL VENOUS BLD VENIPUNCTURE: CPT

## 2019-11-25 PROCEDURE — 85025 COMPLETE CBC W/AUTO DIFF WBC: CPT

## 2019-11-25 PROCEDURE — 77030008467 HC STPLR SKN COVD -B

## 2019-11-25 PROCEDURE — 77030018836 HC SOL IRR NACL ICUM -A

## 2019-11-25 PROCEDURE — 74011250636 HC RX REV CODE- 250/636

## 2019-11-25 PROCEDURE — 86900 BLOOD TYPING SEROLOGIC ABO: CPT

## 2019-11-25 PROCEDURE — 74011000250 HC RX REV CODE- 250: Performed by: FAMILY MEDICINE

## 2019-11-25 PROCEDURE — 74011250636 HC RX REV CODE- 250/636: Performed by: ANESTHESIOLOGY

## 2019-11-25 PROCEDURE — 76010000391 HC C SECN FIRST 1 HR: Performed by: OBSTETRICS & GYNECOLOGY

## 2019-11-25 PROCEDURE — 77030040361 HC SLV COMPR DVT MDII -B

## 2019-11-25 PROCEDURE — 77030018846 HC SOL IRR STRL H20 ICUM -A

## 2019-11-25 PROCEDURE — 74011000250 HC RX REV CODE- 250: Performed by: NURSE ANESTHETIST, CERTIFIED REGISTERED

## 2019-11-25 PROCEDURE — 99284 EMERGENCY DEPT VISIT MOD MDM: CPT

## 2019-11-25 PROCEDURE — 65270000029 HC RM PRIVATE

## 2019-11-25 PROCEDURE — 84112 EVAL AMNIOTIC FLUID PROTEIN: CPT | Performed by: STUDENT IN AN ORGANIZED HEALTH CARE EDUCATION/TRAINING PROGRAM

## 2019-11-25 PROCEDURE — 76060000078 HC EPIDURAL ANESTHESIA: Performed by: OBSTETRICS & GYNECOLOGY

## 2019-11-25 PROCEDURE — 75410000002 HC LABOR FEE PER 1 HR: Performed by: OBSTETRICS & GYNECOLOGY

## 2019-11-25 PROCEDURE — 77030005513 HC CATH URETH FOL11 MDII -B

## 2019-11-25 PROCEDURE — 77010026065 HC OXYGEN MINIMUM MEDICAL AIR: Performed by: OBSTETRICS & GYNECOLOGY

## 2019-11-25 PROCEDURE — 77030007866 HC KT SPN ANES BBMI -B: Performed by: NURSE ANESTHETIST, CERTIFIED REGISTERED

## 2019-11-25 PROCEDURE — 76816 OB US FOLLOW-UP PER FETUS: CPT | Performed by: OBSTETRICS & GYNECOLOGY

## 2019-11-25 PROCEDURE — 74011250636 HC RX REV CODE- 250/636: Performed by: NURSE ANESTHETIST, CERTIFIED REGISTERED

## 2019-11-25 PROCEDURE — 74011000250 HC RX REV CODE- 250: Performed by: ANESTHESIOLOGY

## 2019-11-25 RX ORDER — SODIUM CHLORIDE, SODIUM LACTATE, POTASSIUM CHLORIDE, CALCIUM CHLORIDE 600; 310; 30; 20 MG/100ML; MG/100ML; MG/100ML; MG/100ML
1000 INJECTION, SOLUTION INTRAVENOUS CONTINUOUS
Status: DISCONTINUED | OUTPATIENT
Start: 2019-11-25 | End: 2019-11-25

## 2019-11-25 RX ORDER — SODIUM CHLORIDE 0.9 % (FLUSH) 0.9 %
5-40 SYRINGE (ML) INJECTION EVERY 8 HOURS
Status: DISCONTINUED | OUTPATIENT
Start: 2019-11-25 | End: 2019-11-25 | Stop reason: HOSPADM

## 2019-11-25 RX ORDER — SODIUM CHLORIDE 0.9 % (FLUSH) 0.9 %
5-40 SYRINGE (ML) INJECTION AS NEEDED
Status: DISCONTINUED | OUTPATIENT
Start: 2019-11-25 | End: 2019-11-28 | Stop reason: HOSPADM

## 2019-11-25 RX ORDER — EPHEDRINE SULFATE/0.9% NACL/PF 50 MG/5 ML
SYRINGE (ML) INTRAVENOUS AS NEEDED
Status: DISCONTINUED | OUTPATIENT
Start: 2019-11-25 | End: 2019-11-25 | Stop reason: HOSPADM

## 2019-11-25 RX ORDER — MORPHINE SULFATE 0.5 MG/ML
INJECTION, SOLUTION EPIDURAL; INTRATHECAL; INTRAVENOUS AS NEEDED
Status: DISCONTINUED | OUTPATIENT
Start: 2019-11-25 | End: 2019-11-25 | Stop reason: HOSPADM

## 2019-11-25 RX ORDER — DIPHENHYDRAMINE HYDROCHLORIDE 50 MG/ML
12.5 INJECTION, SOLUTION INTRAMUSCULAR; INTRAVENOUS
Status: DISPENSED | OUTPATIENT
Start: 2019-11-25 | End: 2019-11-26

## 2019-11-25 RX ORDER — OXYCODONE HYDROCHLORIDE 5 MG/1
10 TABLET ORAL
Status: DISCONTINUED | OUTPATIENT
Start: 2019-11-25 | End: 2019-11-26

## 2019-11-25 RX ORDER — SODIUM CHLORIDE, SODIUM LACTATE, POTASSIUM CHLORIDE, CALCIUM CHLORIDE 600; 310; 30; 20 MG/100ML; MG/100ML; MG/100ML; MG/100ML
1000 INJECTION, SOLUTION INTRAVENOUS CONTINUOUS
Status: DISCONTINUED | OUTPATIENT
Start: 2019-11-25 | End: 2019-11-25 | Stop reason: HOSPADM

## 2019-11-25 RX ORDER — ZOLPIDEM TARTRATE 5 MG/1
5 TABLET ORAL
Status: DISCONTINUED | OUTPATIENT
Start: 2019-11-25 | End: 2019-11-28 | Stop reason: HOSPADM

## 2019-11-25 RX ORDER — METHYLERGONOVINE MALEATE 0.2 MG/ML
INJECTION INTRAVENOUS
Status: COMPLETED
Start: 2019-11-25 | End: 2019-11-25

## 2019-11-25 RX ORDER — SODIUM CHLORIDE, SODIUM LACTATE, POTASSIUM CHLORIDE, CALCIUM CHLORIDE 600; 310; 30; 20 MG/100ML; MG/100ML; MG/100ML; MG/100ML
1000 INJECTION, SOLUTION INTRAVENOUS ONCE
Status: COMPLETED | OUTPATIENT
Start: 2019-11-25 | End: 2019-11-25

## 2019-11-25 RX ORDER — IBUPROFEN 800 MG/1
800 TABLET ORAL EVERY 8 HOURS
Status: DISCONTINUED | OUTPATIENT
Start: 2019-11-25 | End: 2019-11-28 | Stop reason: HOSPADM

## 2019-11-25 RX ORDER — LIDOCAINE HYDROCHLORIDE 10 MG/ML
INJECTION, SOLUTION EPIDURAL; INFILTRATION; INTRACAUDAL; PERINEURAL
Status: SHIPPED | OUTPATIENT
Start: 2019-11-25 | End: 2019-11-25

## 2019-11-25 RX ORDER — HYDROMORPHONE HYDROCHLORIDE 2 MG/ML
0.5 INJECTION, SOLUTION INTRAMUSCULAR; INTRAVENOUS; SUBCUTANEOUS
Status: ACTIVE | OUTPATIENT
Start: 2019-11-25 | End: 2019-11-26

## 2019-11-25 RX ORDER — SODIUM CHLORIDE, SODIUM LACTATE, POTASSIUM CHLORIDE, CALCIUM CHLORIDE 600; 310; 30; 20 MG/100ML; MG/100ML; MG/100ML; MG/100ML
125 INJECTION, SOLUTION INTRAVENOUS CONTINUOUS
Status: DISCONTINUED | OUTPATIENT
Start: 2019-11-25 | End: 2019-11-25

## 2019-11-25 RX ORDER — DOCUSATE SODIUM 100 MG/1
100 CAPSULE, LIQUID FILLED ORAL 2 TIMES DAILY
Status: DISCONTINUED | OUTPATIENT
Start: 2019-11-25 | End: 2019-11-28 | Stop reason: HOSPADM

## 2019-11-25 RX ORDER — NALOXONE HYDROCHLORIDE 0.4 MG/ML
0.4 INJECTION, SOLUTION INTRAMUSCULAR; INTRAVENOUS; SUBCUTANEOUS AS NEEDED
Status: DISCONTINUED | OUTPATIENT
Start: 2019-11-25 | End: 2019-11-28 | Stop reason: HOSPADM

## 2019-11-25 RX ORDER — HYDROCODONE BITARTRATE AND ACETAMINOPHEN 5; 325 MG/1; MG/1
1 TABLET ORAL
Status: DISCONTINUED | OUTPATIENT
Start: 2019-11-25 | End: 2019-11-25 | Stop reason: SDUPTHER

## 2019-11-25 RX ORDER — ONDANSETRON 2 MG/ML
INJECTION INTRAMUSCULAR; INTRAVENOUS AS NEEDED
Status: DISCONTINUED | OUTPATIENT
Start: 2019-11-25 | End: 2019-11-25 | Stop reason: HOSPADM

## 2019-11-25 RX ORDER — SIMETHICONE 80 MG
80 TABLET,CHEWABLE ORAL
Status: DISCONTINUED | OUTPATIENT
Start: 2019-11-25 | End: 2019-11-28 | Stop reason: HOSPADM

## 2019-11-25 RX ORDER — SODIUM CHLORIDE 0.9 % (FLUSH) 0.9 %
5-40 SYRINGE (ML) INJECTION EVERY 8 HOURS
Status: DISCONTINUED | OUTPATIENT
Start: 2019-11-25 | End: 2019-11-27

## 2019-11-25 RX ORDER — HYDROCODONE BITARTRATE AND ACETAMINOPHEN 10; 325 MG/1; MG/1
1 TABLET ORAL
Status: DISCONTINUED | OUTPATIENT
Start: 2019-11-25 | End: 2019-11-25 | Stop reason: SDUPTHER

## 2019-11-25 RX ORDER — OXYCODONE HYDROCHLORIDE 5 MG/1
5 TABLET ORAL
Status: DISCONTINUED | OUTPATIENT
Start: 2019-11-25 | End: 2019-11-26

## 2019-11-25 RX ORDER — OXYTOCIN/0.9 % SODIUM CHLORIDE 20/1000 ML
125-500 PLASTIC BAG, INJECTION (ML) INTRAVENOUS ONCE
Status: COMPLETED | OUTPATIENT
Start: 2019-11-25 | End: 2019-11-25

## 2019-11-25 RX ORDER — METHYLERGONOVINE MALEATE 0.2 MG/ML
0.2 INJECTION INTRAVENOUS ONCE
Status: COMPLETED | OUTPATIENT
Start: 2019-11-25 | End: 2019-11-25

## 2019-11-25 RX ORDER — SODIUM CHLORIDE, SODIUM LACTATE, POTASSIUM CHLORIDE, CALCIUM CHLORIDE 600; 310; 30; 20 MG/100ML; MG/100ML; MG/100ML; MG/100ML
125 INJECTION, SOLUTION INTRAVENOUS CONTINUOUS
Status: DISCONTINUED | OUTPATIENT
Start: 2019-11-25 | End: 2019-11-26

## 2019-11-25 RX ORDER — LANOLIN ALCOHOL/MO/W.PET/CERES
325 CREAM (GRAM) TOPICAL
Status: DISCONTINUED | OUTPATIENT
Start: 2019-11-26 | End: 2019-11-28 | Stop reason: HOSPADM

## 2019-11-25 RX ORDER — SODIUM CHLORIDE 0.9 % (FLUSH) 0.9 %
5-40 SYRINGE (ML) INJECTION AS NEEDED
Status: DISCONTINUED | OUTPATIENT
Start: 2019-11-25 | End: 2019-11-25 | Stop reason: HOSPADM

## 2019-11-25 RX ORDER — BUPIVACAINE HYDROCHLORIDE 7.5 MG/ML
INJECTION, SOLUTION EPIDURAL; RETROBULBAR AS NEEDED
Status: DISCONTINUED | OUTPATIENT
Start: 2019-11-25 | End: 2019-11-25 | Stop reason: HOSPADM

## 2019-11-25 RX ORDER — OXYTOCIN 10 [USP'U]/ML
INJECTION, SOLUTION INTRAMUSCULAR; INTRAVENOUS AS NEEDED
Status: DISCONTINUED | OUTPATIENT
Start: 2019-11-25 | End: 2019-11-25 | Stop reason: HOSPADM

## 2019-11-25 RX ORDER — KETOROLAC TROMETHAMINE 30 MG/ML
30 INJECTION, SOLUTION INTRAMUSCULAR; INTRAVENOUS
Status: DISCONTINUED | OUTPATIENT
Start: 2019-11-25 | End: 2019-11-26

## 2019-11-25 RX ORDER — SWAB
1 SWAB, NON-MEDICATED MISCELLANEOUS DAILY
Status: DISCONTINUED | OUTPATIENT
Start: 2019-11-26 | End: 2019-11-28 | Stop reason: HOSPADM

## 2019-11-25 RX ADMIN — Medication 3000 MILLI-UNITS/HR: at 20:38

## 2019-11-25 RX ADMIN — Medication 10 MG: at 18:30

## 2019-11-25 RX ADMIN — KETOROLAC TROMETHAMINE 30 MG: 30 INJECTION, SOLUTION INTRAMUSCULAR at 21:50

## 2019-11-25 RX ADMIN — SODIUM CHLORIDE, SODIUM LACTATE, POTASSIUM CHLORIDE, AND CALCIUM CHLORIDE: 600; 310; 30; 20 INJECTION, SOLUTION INTRAVENOUS at 17:46

## 2019-11-25 RX ADMIN — OXYTOCIN 30 UNITS: 10 INJECTION, SOLUTION INTRAMUSCULAR; INTRAVENOUS at 18:24

## 2019-11-25 RX ADMIN — Medication 15 MG: at 18:11

## 2019-11-25 RX ADMIN — METHYLERGONOVINE MALEATE 0.2 MG: 0.2 INJECTION, SOLUTION INTRAMUSCULAR; INTRAVENOUS at 19:40

## 2019-11-25 RX ADMIN — Medication 15 MG: at 18:21

## 2019-11-25 RX ADMIN — MORPHINE SULFATE 200 MCG: 0.5 INJECTION, SOLUTION EPIDURAL; INTRATHECAL; INTRAVENOUS at 18:07

## 2019-11-25 RX ADMIN — SODIUM CHLORIDE, SODIUM LACTATE, POTASSIUM CHLORIDE, AND CALCIUM CHLORIDE 1000 ML: 600; 310; 30; 20 INJECTION, SOLUTION INTRAVENOUS at 16:29

## 2019-11-25 RX ADMIN — WATER 2 G: 1 INJECTION INTRAMUSCULAR; INTRAVENOUS; SUBCUTANEOUS at 17:56

## 2019-11-25 RX ADMIN — BUPIVACAINE HYDROCHLORIDE 1.4 ML: 7.5 INJECTION, SOLUTION EPIDURAL; RETROBULBAR at 18:07

## 2019-11-25 RX ADMIN — SODIUM CHLORIDE, SODIUM LACTATE, POTASSIUM CHLORIDE, AND CALCIUM CHLORIDE: 600; 310; 30; 20 INJECTION, SOLUTION INTRAVENOUS at 18:25

## 2019-11-25 RX ADMIN — ONDANSETRON HYDROCHLORIDE 4 MG: 2 SOLUTION INTRAMUSCULAR; INTRAVENOUS at 18:01

## 2019-11-25 RX ADMIN — METHYLERGONOVINE MALEATE 0.2 MG: 0.2 INJECTION INTRAVENOUS at 19:40

## 2019-11-25 RX ADMIN — SODIUM CHLORIDE, SODIUM LACTATE, POTASSIUM CHLORIDE, AND CALCIUM CHLORIDE 125 ML/HR: 600; 310; 30; 20 INJECTION, SOLUTION INTRAVENOUS at 17:37

## 2019-11-25 RX ADMIN — SODIUM CHLORIDE, SODIUM LACTATE, POTASSIUM CHLORIDE, AND CALCIUM CHLORIDE 1000 ML: 600; 310; 30; 20 INJECTION, SOLUTION INTRAVENOUS at 16:57

## 2019-11-25 RX ADMIN — LIDOCAINE HYDROCHLORIDE 15 MG: 10 INJECTION, SOLUTION EPIDURAL; INFILTRATION; INTRACAUDAL; PERINEURAL at 18:04

## 2019-11-25 NOTE — OP NOTES
Section Delivery Procedure Note         Name: [id number] Record Number: [id number]      YOB: 1995     Today's Date: 2019      Preoperative Diagnosis: Repeat/Labor    Postoperative Diagnosis: same    Procedure: Low Cervical Transverse Procedure(s):   SECTION    Surgeon:  Christiana Martins MD     Assistant:  Staff    Anesthesia: Spinal    Prophylactic Antibiotics: Ancef         Fetal Description: addison female    Birth Information:   Information for the patient's :  Gómez Espino, Female Abner Fraia [[id number]]          Umbilical Cord: normal    Placenta:  manual    Specimens:   ID Type Source Tests Collected by Time Destination   1 : Placenta and cord  Placenta Uterus  Irvin Young MD 2019 1832 Discarded        Implants:  None           Complications:  none    EBL: see QBL    Procedure Details: The patient was prepped with alcohol and draped with ioban in the supine position with a spinal  anesthetic. Dumas catheter had been placed using sterile technique. A Pfannenstiel incision was made, excising her old skin scar. The fascia was divided and then the rectus muscles were split in the midline. The peritoneum was entered well above the bladder without difficulty. The peritoneum over the lower uterine segment was incised and the bladder flap was developed. The lower uterine segment was membranous--no thicker than the bladder peritoneum. The bladder retractor was placed. The lower uterine segment was entered sharply with a single edge of the Metzenbaum scissors. Amniotomy was performed at the same time, the fluid was medium amount clear. The infant was then delivered to chest level and the mouth and nares were suctioned. The remainder of the infant was delivered. The cord was clamped and cut and the infant handed off to the nursery staff. The placenta was delivered manually; it was normal and intact. It was not sent to pathology. The uterus was cleared of blood, fluid, clot, and membranes and involuted with IV Pitocin given by anesthesia. The uterus was exteriorized and closed in a single, running interlocking layer of 1 Chromic. The 2-4 mm thick lower uterine segment was bunched up to create more thickness. It was hemostatic. The posterior cul-de-sac was cleared of blood and fluid, then the uterus was replaced in the abdominal cavity. The gutters were irrigated with warm saline. The anterior peritoneum and rectus muscles were oozing from multiple sites. After hemostasis with bovie and hemostatic powder they were reapproximated in the midline with a suture of 1 chromic. The fascia was closed from left to right with 1 Stratafix in a running fashion. The subcutaneous space was irrigated after freeing up scar and adhesions and then the skin was closed with stainless steel staples. The final sponge and instrument counts were correct. The patient and infant were taken to the recovery room in good condition.     Signed By:  Cherelle Gallegos MD     November 25, 2019

## 2019-11-25 NOTE — PROGRESS NOTES
1600 - Patient arrived via wheelchair from ed with reports of contractions since 0430 today, reports increased discharge since this afternoon 1400. Patient was seen for [address] today. placed on m.     1610 - Dr Emani Galan at bedside discussing [facility].

## 2019-11-25 NOTE — H&P
[address] [facility] [address] [place] [address]   Office [phone], Fax [phone]      History & Physical    Name: Leopold Has MRN: [id number]  SSN: [id number]    YOB: 1995  Age: 25 y.o. Sex: female      Subjective:     Reason for Admission:  Pregnancy and Contractions    History of Present Illness: Ms. Marcela Goodwin is a 25 y.o. [id number]  female with an estimated gestational age of 42w0d dated by 33 weeks 1 day US with Estimated Date of Delivery: 19. Patient complains of mild abdominal pain, mild contractions, mild vaginal leaking of clear fluid and back pain for 12 hous, started at 4.30 am today and progressed, patient feels contractions every 8 minutes. Pregnancy has been complicated by late to care, mild intermittent asthma w/o complications, anemia during pregnancy in third trimester, history of c/s x2. Patient denies chest pain, fever, headache , nausea and vomiting, right upper quadrant pain  , shortness of breath, swelling, vaginal bleeding  and visual disturbances. Patient reports sexual intercourse within 24 hours.      OB History    Para Term  AB Living   5 3 3   1 3   SAB TAB Ectopic Molar Multiple Live Births   1         3      # Outcome Date GA Lbr Haresh/2nd Weight Sex Delivery Anes PTL Lv   5 Current            4 2018 4w0d    SAB      3 Term 17 40w0d  3.374 kg F CS-Unspec  N LIZZIE   2 Term 13 38w0d  3.629 kg M CS-Unspec  N LIZZIE      Birth Comments: NRFHR/fetal bradycardia which lead to CS   1 Term 12 37w0d  3.175 kg F Vag-Spont None N LIZZIE     Past Medical History:   Diagnosis Date    Asthma     mild intermittent      Past Surgical History:   Procedure Laterality Date    HX  SECTION       Social History     Occupational History    Not on file   Tobacco Use    Smoking status: Never Smoker    Smokeless tobacco: Never Used   Substance and Sexual Activity    Alcohol use: Not Currently    Drug use: Never    Sexual activity: Yes      No family history on file. No Known Allergies  Prior to Admission medications    Medication Sig Start Date End Date Taking? Authorizing Provider   PNV No12-Iron-FA-DSS-OM-3 29 mg iron-1 mg -50 mg CPKD Take  by mouth. Yes Provider, Historical   ferrous sulfate 325 mg (65 mg iron) tablet Take 1 Tab by mouth three (3) times daily (with meals). 19  Yes Jose Daniel Renee DO        Review of Systems:  A comprehensive review of systems was negative except for that written in the History of Present Illness. Objective:     Vitals:    Vitals:    19 1632   Weight: 80.7 kg (178 lb)   Height: 5' 1\" (1.549 m)      No data recorded. No data recorded     Physical Exam:  Patient without distress. Heart: Regular rate and rhythm, S1S2 present or without murmur or extra heart sounds  Lung: clear to auscultation throughout lung fields, no wheezes, no rales, no rhonchi and normal respiratory effort  Abdomen: soft, nontender, gravid  Fundus: soft and non tender  Perineum: blood absent, amniotic fluid absent  Cervical Exam: 3 cm dilated    50% effaced    -2 station    Cervical Position: anterior  Consistency: Soft  Lower Extremities:  - Edema No     Membranes:  Unsure  Uterine Activity:  Frequency: Every 4 minutes   Fetal Heart Rate:  Baseline: 135 per minute  Variability: moderate  Accelerations: yes, one  Decelerations: none       Lab/Data Review:  No results found for this or any previous visit (from the past 24 hour(s)). Assessment and Plan:     Ms. Alma Rosa Quintanilla is a 25 y.o. [id number]  female with an estimated gestational age of 42w0d who is admitted for labor rule out. PNL: RH pos, antibody negative, HIV neg, HBV negative, VZV immune, neg GC/Ch, RPR non reactive, Declined genetic screening, GBS negative    1. SIUP: Hx of  in , C/S in  and 2017, SAB in 2018.  This pregnancy uneventful, but patient was late to care  - Admit for labor rule out  - Amnisure positive, but patient had sexual intercourse within 24 hrs   - Fetal monitoring  - C/section for Hx of 2 sections   - SVE: 3/50/-2 soft, anterior, vertex      2. Mild intermittent asthma without complication  -well controlled, was not on any medication  - continue to monitor    3. Anemia during pregnancy in third trimester  -continue Iron with bowel regimen PP    4. History of  x2:  - Repeat  was scheduled on  at 9:30     Patient discussed with Dr. Ramiro Whitman (OB attending)    Minus MD Julio  Family Medicine Resident

## 2019-11-25 NOTE — ANESTHESIA PROCEDURE NOTES
Spinal Block    Start time: 11/25/2019 6:03 PM  End time: 11/25/2019 6:08 PM  Performed by: Eddie Person CRNA  Authorized by: Leslee Posada DO     Pre-procedure:  Preanesthetic Checklist: patient identified, risks and benefits discussed, anesthesia consent, site marked, patient being monitored and timeout performed    Timeout Time: 18:03          Spinal Block:   Patient Position:  Seated  Prep Region:  Lumbar      Location:  L3-4  Technique:  Single shot        Needle:   Needle Type:  Pencil-tip  Needle Gauge:  25 G  Attempts:  1      Events: CSF confirmed, no blood with aspiration and no paresthesia        Assessment:  Insertion:  Uncomplicated  Patient tolerance:  Patient tolerated the procedure well with no immediate complications

## 2019-11-25 NOTE — PROGRESS NOTES
1556 Patient arrived to Labor and Delivery via wheelchair from the ED. Patient is  complaining of contractions. 1 vaginal delivery and 2 previous  sections. [address] applied. [facility] [facility] [address]. Yamini Al and Bro Joyce MD at bedside. [address] Yamini Al MD at bedside for SVE 3/50/-2.    1650 Tara Kaye MD at bedside updating patient on plan of care. 1800 Patient ambulated to OR for repeat section. 1803 Time out before placement of spinal.     1813 Dr. Tara Kaye in the [address] [place] [address].     1819 Time out before incision. 80 Vigorously crying female infant delivered via  section. 1851 Incision closed. 1857 Patient out of OR.     1900 Delivery QBL 485ml. 1917 Bedside and Verbal shift change report given to Jessika Murillo RN (oncoming nurse) by Nic Ascencio RN (offgoing nurse). Report included the following information SBAR, Kardex, OR Summary and MAR.

## 2019-11-25 NOTE — PROGRESS NOTES
Antepartum Progress Note    Name: Siobhan Morrison MRN: [id number]  SSN: [id number]    YOB: 1995  Age: 25 y.o. Sex: female        Subjective:      LOS: 0 days    Estimated Date of Delivery: 19   Gestational Age Today: 42w0d     Patient admitted for early labor with 2 previous  sections. States she does have moderate contractions and mild vaginal leaking of fluid. Objective:     Vitals:  Blood pressure 109/73, pulse 75, temperature 98.4 °F (36.9 °C), resp. rate 16, height 5' 1\" (1.549 m), weight 178 lb (80.7 kg), last menstrual period 2019, not currently breastfeeding. Temp (24hrs), Av.4 °F (36.9 °C), Min:98.4 °F (36.9 °C), Max:98.4 °F (57.5 °C)    Systolic (32WNJ), OXV:779 , Min:109 , SMR:995      Diastolic (48WMU), NOO:44, Min:73, Max:73       Intake and Output:         Physical Exam:  Fundus AGA and non-tender    Fetal Heart Rate:  Reactive        Labs:   Recent Results (from the past 36 hour(s))   CBC WITH AUTOMATED DIFF    Collection Time: 19  4:30 PM   Result Value Ref Range    WBC 9.9 3.6 - 11.0 K/uL    RBC 4.58 3.80 - 5.20 M/uL    HGB 11.7 11.5 - 16.0 g/dL    HCT 38.4 35.0 - 47.0 %    MCV 83.8 80.0 - 99.0 FL    MCH 25.5 (L) 26.0 - 34.0 PG    MCHC 30.5 30.0 - 36.5 g/dL    RDW 21.7 (H) 11.5 - 14.5 %    PLATELET 696 270 - 705 K/uL    MPV 10.8 8.9 - 12.9 FL    NRBC 0.0 0  WBC    ABSOLUTE NRBC 0.00 0.00 - 0.01 K/uL    NEUTROPHILS PENDING %    LYMPHOCYTES PENDING %    MONOCYTES PENDING %    EOSINOPHILS PENDING %    BASOPHILS PENDING %    IMMATURE GRANULOCYTES PENDING %    ABS. NEUTROPHILS PENDING K/UL    ABS. LYMPHOCYTES PENDING K/UL    ABS. MONOCYTES PENDING K/UL    ABS. EOSINOPHILS PENDING K/UL    ABS. BASOPHILS PENDING K/UL    ABS. IMM. GRANS.  PENDING K/UL    DF PENDING    METABOLIC PANEL, COMPREHENSIVE    Collection Time: 19  4:30 PM   Result Value Ref Range    Sodium 137 136 - 145 mmol/L    Potassium 3.6 3.5 - 5.1 mmol/L    Chloride 104 97 - 108 mmol/L    CO2 22 21 - 32 mmol/L    Anion gap 11 5 - 15 mmol/L    Glucose 64 (L) 65 - 100 mg/dL    BUN 8 6 - 20 MG/DL    Creatinine 0.58 0.55 - 1.02 MG/DL    BUN/Creatinine ratio 14 12 - 20      GFR est AA >60 >60 ml/min/1.73m2    GFR est non-AA >60 >60 ml/min/1.73m2    Calcium 9.1 8.5 - 10.1 MG/DL    Bilirubin, total 0.6 0.2 - 1.0 MG/DL    ALT (SGPT) 16 12 - 78 U/L    AST (SGOT) 14 (L) 15 - 37 U/L    Alk. phosphatase 152 (H) 45 - 117 U/L    Protein, total 7.9 6.4 - 8.2 g/dL    Albumin 3.6 3.5 - 5.0 g/dL    Globulin 4.3 (H) 2.0 - 4.0 g/dL    A-G Ratio 0.8 (L) 1.1 - 2.2         Assessment and Plan:      Previous C/S X 2 for repeat. IC obtained via .     Signed By: Christiana Martins MD     November 25, 2019

## 2019-11-25 NOTE — PROGRESS NOTES
1645 - amnisure positive at this time, however patient reports having intercourse in the last 24 hours so amnisure can not be reliable as positive at this time, message passed along to Dr. Yamini Al to tell Dr. Marjorie Stewart.

## 2019-11-25 NOTE — L&D DELIVERY NOTE
Delivery Summary    Patient: Bud Calabrese MRN: [id number]  SSN: [id number]    YOB: 1995  Age: 25 y.o. Sex: female        Information for the patient's :  Johnnie Elias, Mat Lowery [[id number]]       Labor Events:    Labor: No    Steroids: None   Cervical Ripening Date/Time:       Cervical Ripening Type: None   Antibiotics During Labor: Yes   Rupture Identifier: Sac 1    Rupture Date/Time: 2019 6:23 PM   Rupture Type: AROM   Amniotic Fluid Volume: Moderate    Amniotic Fluid Description: Clear    Amniotic Fluid Odor: None    Induction: None       Induction Date/Time:        Indications for Induction:      Augmentation: None   Augmentation Date/Time:      Indications for Augmentation:     Labor complications: None       Additional complications:        Delivery Events:  Indications For Episiotomy:     Episiotomy: None   Perineal Laceration(s): None   Repaired:     Periurethral Laceration Location:      Repaired:     Labial Laceration Location:     Repaired:     Sulcal Laceration Location:     Repaired:     Vaginal Laceration Location:     Repaired:     Cervical Laceration Location:     Repaired:     Repair Suture: None   Number of Repair Packets:     Estimated Blood Loss (ml):  ml     Delivery Date: 2019    Delivery Time: 6:23 PM   Delivery Type: , Low Transverse     Details    Trial of Labor: No   Primary/Repeat: Repeat   Priority: Routine   Indications:  Prior Uterine Surgery       Sex:  Female     Gestational Age: 38w0d  Delivery Clinician:  Carla Chou  Living Status: Living   Delivery Location: OR            APGARS  One minute Five minutes Ten minutes   Skin color: 1   1        Heart rate: 2   2        Grimace: 2   2        Muscle tone: 2   2        Breathin   2        Totals: 9   9          Presentation: Vertex    Position:        Resuscitation Method:  Suctioning-bulb; Tactile Stimulation     Meconium Stained: None      Cord Information: 3 Vessels  Complications: Nuchal Cord Without Compressions  Cord around:    Delayed cord clamping? Yes  Cord clamped date/time:2019  6:24 PM  Disposition of Cord Blood: Lab    Blood Gases Sent?: No    Placenta:  Date/Time: 2019  6:24 PM  Removal: Manual Removal      Appearance: Normal;Intact      Measurements:  Birth Weight:        Birth Length:        Head Circumference:        Chest Circumference:       Abdominal Girth: Other Providers:   CLAUDETTE Krueger;ELDA OSORIO;DARREN JOHNSON;MARCIO SCHMIDT;BRADEN OVERTON;Kimberly HAMMOND, Obstetrician;Primary Nurse;Primary  Nurse;Primary Nurse;Crna;Scrub Tech;Surgeon Assistant; Resident             Group B Strep:   Lab Results   Component Value Date/Time    GrBStrep, External negative  2019     Information for the patient's :  Miki Monday, Female Ane Jose Alfredo [[id number]]   No results found for: ABORH, PCTABR, PCTDIG, BILI, ABORHEXT, ABORH    No results for input(s): PCO2CB, PO2CB, HCO3I, SO2I, IBD, PTEMPI, SPECTI, PHICB, ISITE, IDEV, IALLEN in the last 72 hours.

## 2019-11-25 NOTE — ED TRIAGE NOTES
Spoke to Angelika in L&D. States to send patient. Patient transported via wheelchair to L&D by ED tech.

## 2019-11-25 NOTE — ANESTHESIA PREPROCEDURE EVALUATION
Anesthetic History   No history of anesthetic complications            Review of Systems / Medical History  Patient summary reviewed and pertinent labs reviewed    Pulmonary            Asthma        Neuro/Psych   Within defined limits           Cardiovascular  Within defined limits                     GI/Hepatic/Renal  Within defined limits              Endo/Other  Within defined limits           Other Findings   Comments: Multigravida, repeat            Physical Exam    Airway  Mallampati: II    Neck ROM: normal range of motion   Mouth opening: Normal     Cardiovascular    Rhythm: regular  Rate: normal         Dental  No notable dental hx       Pulmonary  Breath sounds clear to auscultation               Abdominal  GI exam deferred       Other Findings            Anesthetic Plan    ASA: 2  Anesthesia type: spinal            Anesthetic plan and risks discussed with: Patient       used #[id number].  Informed COnsent obtained

## 2019-11-26 LAB
ANION GAP SERPL CALC-SCNC: 8 MMOL/L (ref 5–15)
BASOPHILS # BLD: 0 K/UL (ref 0–0.1)
BASOPHILS NFR BLD: 0 % (ref 0–1)
BUN SERPL-MCNC: 8 MG/DL (ref 6–20)
BUN/CREAT SERPL: 16 (ref 12–20)
CALCIUM SERPL-MCNC: 8.1 MG/DL (ref 8.5–10.1)
CHLORIDE SERPL-SCNC: 105 MMOL/L (ref 97–108)
CO2 SERPL-SCNC: 25 MMOL/L (ref 21–32)
COMMENT, HOLDF: NORMAL
CREAT SERPL-MCNC: 0.51 MG/DL (ref 0.55–1.02)
DIFFERENTIAL METHOD BLD: ABNORMAL
EOSINOPHIL # BLD: 0 K/UL (ref 0–0.4)
EOSINOPHIL NFR BLD: 0 % (ref 0–7)
ERYTHROCYTE [DISTWIDTH] IN BLOOD BY AUTOMATED COUNT: 21.6 % (ref 11.5–14.5)
GLUCOSE SERPL-MCNC: 70 MG/DL (ref 65–100)
HCT VFR BLD AUTO: 30 % (ref 35–47)
HGB BLD-MCNC: 9.2 G/DL (ref 11.5–16)
IMM GRANULOCYTES # BLD AUTO: 0.1 K/UL (ref 0–0.04)
IMM GRANULOCYTES NFR BLD AUTO: 1 % (ref 0–0.5)
LYMPHOCYTES # BLD: 1.7 K/UL (ref 0.8–3.5)
LYMPHOCYTES NFR BLD: 15 % (ref 12–49)
MCH RBC QN AUTO: 25.9 PG (ref 26–34)
MCHC RBC AUTO-ENTMCNC: 30.7 G/DL (ref 30–36.5)
MCV RBC AUTO: 84.5 FL (ref 80–99)
MONOCYTES # BLD: 0.7 K/UL (ref 0–1)
MONOCYTES NFR BLD: 6 % (ref 5–13)
NEUTS SEG # BLD: 8.7 K/UL (ref 1.8–8)
NEUTS SEG NFR BLD: 78 % (ref 32–75)
NRBC # BLD: 0 K/UL (ref 0–0.01)
NRBC BLD-RTO: 0 PER 100 WBC
PLATELET # BLD AUTO: 189 K/UL (ref 150–400)
PMV BLD AUTO: 10.9 FL (ref 8.9–12.9)
POTASSIUM SERPL-SCNC: 3.9 MMOL/L (ref 3.5–5.1)
RBC # BLD AUTO: 3.55 M/UL (ref 3.8–5.2)
RBC MORPH BLD: ABNORMAL
SAMPLES BEING HELD,HOLD: NORMAL
SODIUM SERPL-SCNC: 138 MMOL/L (ref 136–145)
WBC # BLD AUTO: 11.2 K/UL (ref 3.6–11)

## 2019-11-26 PROCEDURE — 85025 COMPLETE CBC W/AUTO DIFF WBC: CPT

## 2019-11-26 PROCEDURE — 80048 BASIC METABOLIC PNL TOTAL CA: CPT

## 2019-11-26 PROCEDURE — 74011250636 HC RX REV CODE- 250/636: Performed by: FAMILY MEDICINE

## 2019-11-26 PROCEDURE — 74011250637 HC RX REV CODE- 250/637: Performed by: FAMILY MEDICINE

## 2019-11-26 PROCEDURE — 74011250636 HC RX REV CODE- 250/636: Performed by: ANESTHESIOLOGY

## 2019-11-26 PROCEDURE — 36415 COLL VENOUS BLD VENIPUNCTURE: CPT

## 2019-11-26 PROCEDURE — 74011250636 HC RX REV CODE- 250/636: Performed by: STUDENT IN AN ORGANIZED HEALTH CARE EDUCATION/TRAINING PROGRAM

## 2019-11-26 PROCEDURE — 51798 US URINE CAPACITY MEASURE: CPT

## 2019-11-26 PROCEDURE — 65270000029 HC RM PRIVATE

## 2019-11-26 RX ORDER — HYDROCODONE BITARTRATE AND ACETAMINOPHEN 7.5; 325 MG/1; MG/1
1 TABLET ORAL
Status: DISCONTINUED | OUTPATIENT
Start: 2019-11-26 | End: 2019-11-28 | Stop reason: HOSPADM

## 2019-11-26 RX ORDER — HYDROCODONE BITARTRATE AND ACETAMINOPHEN 5; 325 MG/1; MG/1
1 TABLET ORAL
Status: DISCONTINUED | OUTPATIENT
Start: 2019-11-26 | End: 2019-11-28 | Stop reason: HOSPADM

## 2019-11-26 RX ORDER — SODIUM CHLORIDE, SODIUM LACTATE, POTASSIUM CHLORIDE, CALCIUM CHLORIDE 600; 310; 30; 20 MG/100ML; MG/100ML; MG/100ML; MG/100ML
250 INJECTION, SOLUTION INTRAVENOUS CONTINUOUS
Status: DISCONTINUED | OUTPATIENT
Start: 2019-11-26 | End: 2019-11-26

## 2019-11-26 RX ORDER — SODIUM CHLORIDE 9 MG/ML
125 INJECTION, SOLUTION INTRAVENOUS CONTINUOUS
Status: DISCONTINUED | OUTPATIENT
Start: 2019-11-26 | End: 2019-11-26

## 2019-11-26 RX ADMIN — SODIUM CHLORIDE, SODIUM LACTATE, POTASSIUM CHLORIDE, AND CALCIUM CHLORIDE 125 ML/HR: 600; 310; 30; 20 INJECTION, SOLUTION INTRAVENOUS at 08:45

## 2019-11-26 RX ADMIN — DIPHENHYDRAMINE HYDROCHLORIDE 12.5 MG: 50 INJECTION, SOLUTION INTRAMUSCULAR; INTRAVENOUS at 01:06

## 2019-11-26 RX ADMIN — DOCUSATE SODIUM 100 MG: 100 CAPSULE, LIQUID FILLED ORAL at 17:36

## 2019-11-26 RX ADMIN — IBUPROFEN 800 MG: 800 TABLET ORAL at 14:37

## 2019-11-26 RX ADMIN — SIMETHICONE CHEW TAB 80 MG 80 MG: 80 TABLET ORAL at 14:37

## 2019-11-26 RX ADMIN — SODIUM CHLORIDE, SODIUM LACTATE, POTASSIUM CHLORIDE, AND CALCIUM CHLORIDE 1000 ML/HR: 600; 310; 30; 20 INJECTION, SOLUTION INTRAVENOUS at 07:23

## 2019-11-26 RX ADMIN — FERROUS SULFATE TAB 325 MG (65 MG ELEMENTAL FE) 325 MG: 325 (65 FE) TAB at 09:38

## 2019-11-26 RX ADMIN — HYDROCODONE BITARTRATE AND ACETAMINOPHEN 1 TABLET: 5; 325 TABLET ORAL at 14:37

## 2019-11-26 RX ADMIN — DOCUSATE SODIUM 100 MG: 100 CAPSULE, LIQUID FILLED ORAL at 09:38

## 2019-11-26 RX ADMIN — SODIUM CHLORIDE, SODIUM LACTATE, POTASSIUM CHLORIDE, AND CALCIUM CHLORIDE 125 ML/HR: 600; 310; 30; 20 INJECTION, SOLUTION INTRAVENOUS at 01:07

## 2019-11-26 RX ADMIN — Medication 1 TABLET: at 09:38

## 2019-11-26 NOTE — PROGRESS NOTES
Bedside and Verbal shift change report given to HARSH Cuenca (oncoming nurse) by Aki Jasmine RN (offgoing nurse). Report included the following information SBAR, Kardex, MAR and Recent Results.

## 2019-11-26 NOTE — LACTATION NOTE
This note was copied from a baby's chart. Mom states baby nursed fine after delivery. States she has nursed last three children for at least 2 years. States she ws very tired and had baby in nursery during the night and was given formula. States plans to do both breast and formula. Discussed the importance of colostrum, supply and demand, possible nipple confusion. Hand Expression Education:  Mom taught how to manually hand express her colostrum. Emphasized the importance of providing infant with valuable colostrum as infant rests skin to skin at breast.  Aware to avoid extended periods of non-feeding. Aware to offer 10-20+ drops of colostrum every 2-3 hours until infant is latching and nursing effectively. Taught the rationale behind this low tech but highly effective evidence based practice. Many drops noted. Discussed with mother her plan for feeding. Reviewed the benefits of exclusive breast milk feeding during the hospital stay. Informed her of the risks of using formula to supplement in the first few days of life as well as the benefits of successful breast milk feeding; referred her to the Breastfeeding booklet about this information. She acknowledges understanding of information reviewed and states that it is her plan to both breast feed and formula feed her infant. Will support her choice and offer additional information as needed. Reviewed breastfeeding basics:  Supply and demand,  stomach size, early  Feeding cues, skin to skin, positioning and baby led latch-on, assymetrical latch with signs of good, deep latch vs shallow, feeding frequency and duration, and log sheet for tracking infant feedings and output. Breastfeeding Booklet and Warm line information given. Discussed typical  weight loss and the importance of infant weight checks with pediatrician 1-2 post discharge.     Pt will successfully establish breastfeeding by feeding in response to early feeding cues or wake every 3h, will obtain deep latch, and will keep log of feedings/output. Taught to BF at hunger cues and or q 2-3 hrs and to offer 10-20 drops of hand expressed colostrum at any non-feeds. Breast Assessment  Left Breast: Medium  Left Nipple: Everted, Intact  Right Breast: Medium  Right Nipple: Everted, Intact  Breast- Feeding Assessment  Attends Breast-Feeding Classes: No  Breast-Feeding Experience: Yes(nursed first 3 gutierrez over 2 years)  Breast Trauma/Surgery: No  Type/Quality: Attempted  Lactation Consultant Visits  Breast-Feedings: Attempted breast-feeding  Mother/Infant Observation  Mother Observation: Holds breast, Close hold, Breast comfortable(no latch achieved)  Infant Observation: (no latched achieved)  LATCH Documentation  Latch:  Too sleepy or reluctant, no latch achieved  Audible Swallowing: None  Type of Nipple: Everted (after stimulation)(used nipple shield due to gestational age.)  Comfort (Breast/Nipple): Soft/non-tender  Hold (Positioning): Full assist, teach one side, mother does other, staff holds  [facility] Score: 5

## 2019-11-26 NOTE — PROGRESS NOTES
2019  11:30 AM     CM met with KENZIE to complete the initial assessment and begin discharge planning. Demographics verified and confirmed. KENZIE lives at listed address with FOB and three children ages 2,6,and 9. FOB- Alyson Salas [phone], works in construction and will not be taking time off from work. KENZIE states she has family and friends nearby who can provide relief and assistance. KENZIE does not work. KENZIE plans to breast and bottle feed. KENZIE is receiving WIC and has Medicaid coverage for 1 y/o. CM educated KENZIE on contacting [facility] worker and adding  to case. KENZIE understands she will need to call [facility] and make appointment, and has phone number. KENZIE will use [facility]- Dr. Eleni Vallejo and understands she will need to call to make appointment for follow up. KENZIE does not have insurance and may qualify for Emergency Medicaid, [employer] to follow up for screening. KENZIE does not have anything for safe sleep, and verbalized she was unsure if family could afford. MOB confirmed they would be able to afford car seat, but was hesitant that they could afford anything for safe sleep. FOB to purchase car seat tomorrow and will bring to hospital.  CM to provide baby box for safe sleep. Care Management Interventions  PCP Verified by CM: Yes(Dr. Dorina Figueredo )  Mode of Transport at Discharge:  Other (see comment)(Boyfriend- Alyson Salas can provide transport)  Transition of Care Consult (CM Consult): Discharge Planning  Current Support Network: Own Home, Lives with Spouse(Lives with boyfriend and 3 children)  Confirm Follow Up Transport: Family  Plan discussed with Pt/Family/Caregiver: Yes  Discharge Location  Discharge Placement: Home with outpatient services    [facility]  Care Management

## 2019-11-26 NOTE — PROGRESS NOTES
[address]: RN in [address] for circ relief with Duong Osborne RN   1900: Back to room from OR - Bedside SBAR report received from ANTONIO Corbett RN.  1925: Fundal check preformed, clots present, uterus remains firm, pad changed - will retain clots to weigh. 1930Yewilmer Stringer MD notified - Tyson Cough - give since  Dose of 0.2 methergine now. 1940: Methergine given, additional fundal check preformed, bleeding remains moderate with small clots.

## 2019-11-26 NOTE — PROGRESS NOTES
[address] [facility] [address] [place] [address]   Office [phone], Fax [phone]                               Post-Operative Day Number 1 Progress Note    Patient doing well post-op day 1 from  delivery without significant complaints. Pain well controlled. Lochia moderate. Tolerating diet. Ambulating. Dumas is still inserted. - flatus.  - BM. Vitals:    Patient Vitals for the past 8 hrs:   BP Temp Pulse Resp   19 0255 107/53 98.1 °F (36.7 °C) 78 18     Temp (24hrs), Av.1 °F (36.7 °C), Min:97.8 °F (36.6 °C), Max:98.4 °F (36.9 °C)      Vital signs stable, afebrile. Intake and Output:         Exam:   Patient without distress               CTAB, no w/r/r/c    RRR, + S1 and S2, no m/r/g    Abdomen soft, fundus firm and below umbilicus, appropriately tender                Incision covered with tape, appropriately tender               Lower extremities negative for swelling, no cords or tenderness, SCDs in place    Lab/Data Review:  Recent Results (from the past 12 hour(s))   CBC WITH AUTOMATED DIFF    Collection Time: 19  3:03 AM   Result Value Ref Range    WBC 11.2 (H) 3.6 - 11.0 K/uL    RBC 3.55 (L) 3.80 - 5.20 M/uL    HGB 9.2 (L) 11.5 - 16.0 g/dL    HCT 30.0 (L) 35.0 - 47.0 %    MCV 84.5 80.0 - 99.0 FL    MCH 25.9 (L) 26.0 - 34.0 PG    MCHC 30.7 30.0 - 36.5 g/dL    RDW 21.6 (H) 11.5 - 14.5 %    PLATELET 134 108 - 574 K/uL    MPV 10.9 8.9 - 12.9 FL    NRBC 0.0 0  WBC    ABSOLUTE NRBC 0.00 0.00 - 0.01 K/uL    NEUTROPHILS 78 (H) 32 - 75 %    LYMPHOCYTES 15 12 - 49 %    MONOCYTES 6 5 - 13 %    EOSINOPHILS 0 0 - 7 %    BASOPHILS 0 0 - 1 %    IMMATURE GRANULOCYTES 1 (H) 0.0 - 0.5 %    ABS. NEUTROPHILS 8.7 (H) 1.8 - 8.0 K/UL    ABS. LYMPHOCYTES 1.7 0.8 - 3.5 K/UL    ABS. MONOCYTES 0.7 0.0 - 1.0 K/UL    ABS. EOSINOPHILS 0.0 0.0 - 0.4 K/UL    ABS. BASOPHILS 0.0 0.0 - 0.1 K/UL    ABS. IMM.  GRANS. 0.1 (H) 0.00 - 0.04 K/UL    DF SMEAR SCANNED      RBC COMMENTS ANISOCYTOSIS  1+ Patient : Mary Angelo Age: 41 year old Sex: female   MRN: [id number] Encounter Date: 8/6/2019   G26/G26      History     Chief Complaint   Patient presents with   • Foot Pain     HPI     8/6/2019  3:45 PM Mary Angelo is a 41 year old female with a past medical history of GERD and anxiety who presents to the ED for evaluation of left foot pain. The patient reports that she took a misstep and fell down three steps and experienced an inversion injury of her left ankle. Patient denies any head injury or LOC. She states that she broke her left foot last year and \"this feels the same\". Patient is still able to ambulate without significant difficulty. Patient has taken Aleve at home without significant relief. Patient denies any chest pain, shortness of breath, or any other signs or symptoms. No other concerns or complaints were offered at this time.     PCP: Shannon Mcfarlane MD      No Known Allergies    Prior to Admission Medications    Details   traMADol (ULTRAM) 50 MG tablet Take 1 tablet by mouth every 6 hours as needed for Pain.  Qty: 24 tablet, Refills: 0      HYDROcodone-acetaminophen (NORCO) 5-325 MG per tablet Take 1-2 tablets by mouth every 6 hours as needed for Pain. Maximum of 4000 mg of acetaminophen per 24 hours from ALL sources  Qty: 24 tablet, Refills: 0      albuterol 108 (90 Base) MCG/ACT inhaler Inhale 2 puffs into the lungs every 4 hours as needed for Wheezing.  Qty: 8.5 g, Refills: 0      naproxen (NAPROSYN) 500 MG tablet Take 1 tablet by mouth 2 times daily (with meals).  Qty: 10 tablet, Refills: 0      albuterol 108 (90 Base) MCG/ACT inhaler Inhale 2 puffs into the lungs every 4 hours as needed for Wheezing.  Qty: 8.5 g, Refills: 0      cyclobenzaprine (FLEXERIL) 10 MG tablet Take 1 tablet by mouth 3 times daily as needed for Muscle spasms.  Qty: 20 tablet, Refills: 0      meloxicam (MOBIC) 15 MG tablet Take 1 tablet by mouth daily. Take as needed for pain  Qty: 30 tablet, Refills: 0      diclofenac  (VOLTAREN) 1 % gel Apply 4g to affected area QID prn pain  Qty: 5 Tube, Refills: 3      DIAZepam (VALIUM) 5 MG tablet Take 1 tablet by mouth 2 times daily as needed for Muscle spasms.  Qty: 10 tablet, Refills: 0      penicillin v potassium (VEETIDS) 250 MG tablet Unsure of dosage.      Valacyclovir HCl (VALTREX) 1000 MG TABS 2 tabl po now and 2 tabl po in 12 hours  Qty: 4 tablet, Refills: 0      predniSONE (DELTASONE) 20 MG tablet Take 3 tab po today, then 2 tab po daily for 3 days, then 1 tab po daily for 3 days.  Qty: 12 tablet, Refills: 0      albuterol (VENTOLIN) 108 (90 BASE) MCG/ACT inhaler Inhale 2 puffs into the lungs 4 times daily as needed for Shortness of Breath, Wheezing or Other (CHEST TIGHTNESS OR CONGESTION).               Past Medical History:   Diagnosis Date   • Anxiety    • Bronchitis    • Gastro-oesophageal reflux disease    • Neuromuscular disorder (CMS/HCC)        Past Surgical History:   Procedure Laterality Date   • CARPAL TUNNEL RELEASE  2006    right and left wrists   • COLPOSCOPY         Family History   Problem Relation Age of Onset   • Cancer Mother 40        Breast Cancer   • Cancer Maternal Grandmother        Social History     Tobacco Use   • Smoking status: Current Some Day Smoker     Packs/day: 0.25     Years: 10.00     Pack years: 2.50     Types: Cigarettes   • Smokeless tobacco: Never Used   • Tobacco comment: hasn't smoked in 4 days   Substance Use Topics   • Alcohol use: Yes     Comment: ocassional   • Drug use: No     Comment: crack       Review of Systems   Constitutional: Negative for chills and fever.   HENT: Negative for congestion and rhinorrhea.    Respiratory: Negative for cough and shortness of breath.    Cardiovascular: Negative for chest pain and leg swelling.   Gastrointestinal: Negative for abdominal pain, nausea and vomiting.   Genitourinary: Negative for dysuria and frequency.   Musculoskeletal: Positive for arthralgias ( Left foot pain). Negative for myalgias.  Assessment and Plan:    Dorian Drake is a 25 y.o.  s/p rLTCS at 38 weeks 0 days. Pregnancy was complicated by anemia. Patient appears to be having uncomplicated post- course. 1. Morning labs drawn to assess anemia and kidney function, as patient has very little UOP  2. Strict Is&Os, s/p bolus 250 cc, will give another 1L NS bolus and keep on 125/hr MIVF  3. EBL: 1000cc, no bleeding now. S/p methergine once. Will continue to monitor. Iron TID on board  4. Continue routine post-op care and maternal education. 5. D/C piña 24 hours post-op if adequate UOP maintained throughout the day. 6. Incision bandage may be removed 24 hours post-op.     Patient discussed with Dr. Onur Lao (OB attending)    Erma Rangel MD  Family Medicine Resident   Skin: Negative for rash.   Allergic/Immunologic: Negative for immunocompromised state.   Neurological: Negative for dizziness and light-headedness.       Physical Exam     ED Triage Vitals [08/06/19 1542]   ED Triage Vitals Group      Temp 99.1 °F (37.3 °C)      Pulse 96      Resp 16      BP (!) 174/102      SpO2 97 %      EtCO2 mmHg       Height 5' 1\" (1.549 m)      Weight 210 lb (95.3 kg)      Weight Scale Used ED Stated       Physical Exam   Constitutional: She appears well-developed and well-nourished. No distress.   HENT:   Head: Normocephalic and atraumatic.   Right Ear: External ear normal.   Left Ear: External ear normal.   Eyes: Pupils are equal, round, and reactive to light. EOM are normal. No scleral icterus.   Neck: Neck supple.   Cardiovascular: Normal rate, regular rhythm, normal heart sounds and intact distal pulses.   Pulmonary/Chest: Effort normal and breath sounds normal. No respiratory distress. She has no wheezes. She has no rales.   Abdominal: There is no tenderness.   Musculoskeletal:        Left ankle: Normal. No proximal fibula tenderness found. Achilles tendon normal.        Left foot: There is normal capillary refill, no crepitus and no deformity.   No swelling or ecchymosis of the left ankle. Tenderness over the 5th metatarsal. No decreased ROM. DP pulse is 2+. Left ankle is normal. Patient ambulates without difficulty.    Neurological: She is alert.   Skin: Skin is warm and dry. She is not diaphoretic. No erythema. No pallor.   Psychiatric: She has a normal mood and affect.   Nursing note and vitals reviewed.      ED Course     Procedures    Radiology Results     Imaging Results          XR Foot 3 + View Left (Final result)  Result time 08/06/19 17:11:59    Final result                 Impression:    IMPRESSION:    1.  No acute osseous findings.  2.  Redemonstrated remote fracture of base of the fifth metatarsal.   Fracture line is still visible    I have personally reviewed the images  and modified the resident's report as  necessary.               Narrative:    XR FOOT 3+ VW LEFT 8/6/2019 4:25 PM    HISTORY: 41-year-old female presents with a history of fifth metatarsal  pain after fall.    COMPARISON: Left foot radiograph 6/27/2019.    TECHNIQUE:  3 views of the left foot.    FINDINGS:    No acute fracture or dislocation. There is no joint space narrowing.    Redemonstrated remote fracture of the base of the fifth metatarsal. There  is trabecular bridging with mild callus formation of the remote fracture,  appears to be nonhealing and similar to prior exam.                                [facility]    Vitals  Vitals:    08/06/19 1542   BP: (!) 174/102   Pulse: 96   Resp: 16   Temp: 99.1 °F (37.3 °C)   TempSrc: Oral   SpO2: 97%   Weight: 95.3 kg   Height: 5' 1\" (1.549 m)       ED Course    Mary Angelo is a 41 year old female who presents in the ED for evaluation of left ankle pain s/p an inversion injury that occurred 2 days ago after falling down 3 stairs. No head injury or LOC. Patient is able to ambulate without difficulty. DP pulse is 2+. We have discussed the plan for XR and analgesics. She has expressed understanding and agreement of this plan. All questions and concerns at this time have been addressed.     5:28 PM I reassessed the patient and she is feeling better at this time. We have discussed all imaging results which did not demonstrate any acute abnormalities. Recommended RICE. Patient was instructed to seek close evaluation with Ortho if her pain does not improve. She is established with Dr. Valdes. Patient will be placed in a post-op shoe. Strict return precautions were given, patient is to return to the ED for any new or worsening symptom. All their questions were answered at this time and they are in agreement of the plan.     [facility]  Critical Care time spent on this patient outside of billable procedures:  None    Clinical Impression  ED Diagnoses        Final diagnoses    Left foot pain           Fall down steps, initial encounter                The patient was provided with a recommendation to follow up with a primary care provider and obtain reassessment of his/her blood pressure within three months.    Follow Up:  Dr. Valdes, orthopedics, if symptoms not improved                Summary of your Discharge Medications      You have not been prescribed any medications.         Pt is discharged to home/self care in stable condition.       I have reviewed the information recorded by the scribe for accuracy and agree with its contents.    ____________________________________________________________________    Omar Wood acting as a scribe for Noni Amaro PA-C.    Noni Amaro PA-C  Dictation #[id number]  Scribe: Omar Wood    Attending Physician: Dr. Halle Blandon  Dictation #[id number]       Noni Villar PA-C  08/06/19 8240

## 2019-11-26 NOTE — PROGRESS NOTES
[address] [facility] [address]   Office [phone], Fax [phone]                                   Progress Note    Nurse from [facility] called and states Pt has been voiding since Dumas was removed at 12:00PM today. States she is eating and drinking appropriately. Chart review shows that Pt has had 530 CC UOP since her last LR bolus at 0800 today. Pt examined at bedside. Denies abdominal pain or difficulty urinating. Is eating and drinking     Vitals:    Patient Vitals for the past 8 hrs:   BP Temp Pulse Resp   19 1455 118/57 98.5 °F (36.9 °C) (!) 104 16   19 1159 106/53 98.7 °F (37.1 °C) 91 16   19 0916 113/54  76      Temp (24hrs), Av.2 °F (36.8 °C), Min:97.8 °F (36.6 °C), Max:98.7 °F (37.1 °C)      Vital signs stable, afebrile. Intake and Output:     Date 19 0700 - 19 0659   Shift 2617-2784 2311-4974 7462-1441 24 Hour Total   INTAKE   P.O. 400   400   I. V.(mL/kg/hr) 375(0.6)   375   Shift Total(mL/kg) 775(9.6)   775(9.6)   OUTPUT   Urine(mL/kg/hr) 530(0.8) 550  1080   Shift Total(mL/kg) 530(6.6) 550(6.8)  1080(13.4)   Weight (kg) 80.7 80.7 80.7 80.7       Exam:   Patient without distress, sitting in chair by her bed.      Lab/Data Review:  Recent Results (from the past 12 hour(s))   METABOLIC PANEL, BASIC    Collection Time: 19  7:15 AM   Result Value Ref Range    Sodium 138 136 - 145 mmol/L    Potassium 3.9 3.5 - 5.1 mmol/L    Chloride 105 97 - 108 mmol/L    CO2 25 21 - 32 mmol/L    Anion gap 8 5 - 15 mmol/L    Glucose 70 65 - 100 mg/dL    BUN 8 6 - 20 MG/DL    Creatinine 0.51 (L) 0.55 - 1.02 MG/DL    BUN/Creatinine ratio 16 12 - 20      GFR est AA >60 >60 ml/min/1.73m2    GFR est non-AA >60 >60 ml/min/1.73m2    Calcium 8.1 (L) 8.5 - 10.1 MG/DL   SAMPLES BEING HELD    Collection Time: 19  7:15 AM   Result Value Ref Range    SAMPLES BEING HELD 1SST     COMMENT        Add-on orders for these samples will be processed based on acceptable specimen integrity and analyte stability, which may vary by analyte. Assessment and Plan:    Zev Pineda is a 25 y.o.   POD1 s/p rLTCS at 38 weeks 0 days. Pregnancy was complicated by anemia. Patient appears to be having uncomplicated post- course. 1. Continue routine post-op care and maternal education. 2. Dumas d/c'd at 12:00PM, & Pt urinating without difficulty  3. Okay to d/c MIF- encourage PO intake. 4. Will continue to monitor IP/OP     Dr. Rubén Douglass (OB attending) made aware    Duong Cardona D.O.    Family Medicine Resident PGY1

## 2019-11-26 NOTE — PROGRESS NOTES
11/26/2019  3:55 PM    CM delivered baby box to MOB and provided with info/risks sheet. MOB acknowledge understanding and signed form.       Nancy Spurling, [facility]  Care Management

## 2019-11-26 NOTE — ROUTINE PROCESS
This RN updating patient on plan of care via  number [id number]. This RN discussing catheter removal, discharge paperwork, the need to call nursing before getting up and pericare. Patient verbalizes understanding of all teaching.

## 2019-11-26 NOTE — ANESTHESIA POSTPROCEDURE EVALUATION
Procedure(s):   SECTION. spinal    Anesthesia Post Evaluation      Multimodal analgesia: multimodal analgesia not used between 6 hours prior to anesthesia start to PACU discharge  Patient location during evaluation: PACU  Patient participation: complete - patient participated  Level of consciousness: awake  Pain management: adequate  Airway patency: patent  Anesthetic complications: no  Cardiovascular status: acceptable, blood pressure returned to baseline and hemodynamically stable  Respiratory status: acceptable  Hydration status: acceptable  Post anesthesia nausea and vomiting:  controlled      No vitals data found for the desired time range.

## 2019-11-26 NOTE — PROGRESS NOTES
0400 - Pt with decreased output since arriving to unit. Contacted Dr Melvin Hannon. She will review chart and call back with order. 0430 - SPoke to Dr Melvin Hannon, advised to bladder scan and irrigate piña to ensure patancy. Will call with results. 46 - Spoke to Dr Melvin Hannon, Pt bladder scanned (22cc) and piña irrigated with 20cc. Output over last 2 hours, 40cc. Order received for 250 LR bolus.

## 2019-11-27 PROCEDURE — 74011250637 HC RX REV CODE- 250/637: Performed by: FAMILY MEDICINE

## 2019-11-27 PROCEDURE — 65270000029 HC RM PRIVATE

## 2019-11-27 RX ORDER — HYDROCODONE BITARTRATE AND ACETAMINOPHEN 5; 325 MG/1; MG/1
1 TABLET ORAL
Qty: 5 TAB | Refills: 0 | Status: SHIPPED | OUTPATIENT
Start: 2019-11-27 | End: 2019-11-29

## 2019-11-27 RX ORDER — IBUPROFEN 800 MG/1
800 TABLET ORAL
Qty: 60 TAB | Refills: 0 | Status: SHIPPED | OUTPATIENT
Start: 2019-11-27

## 2019-11-27 RX ORDER — DOCUSATE SODIUM 100 MG/1
100 CAPSULE, LIQUID FILLED ORAL 2 TIMES DAILY
Qty: 60 CAP | Refills: 2 | Status: SHIPPED | OUTPATIENT
Start: 2019-11-27 | End: 2020-02-25

## 2019-11-27 RX ADMIN — IBUPROFEN 800 MG: 800 TABLET ORAL at 02:41

## 2019-11-27 RX ADMIN — IBUPROFEN 800 MG: 800 TABLET ORAL at 18:07

## 2019-11-27 RX ADMIN — DOCUSATE SODIUM 100 MG: 100 CAPSULE, LIQUID FILLED ORAL at 18:07

## 2019-11-27 RX ADMIN — FERROUS SULFATE TAB 325 MG (65 MG ELEMENTAL FE) 325 MG: 325 (65 FE) TAB at 10:54

## 2019-11-27 RX ADMIN — HYDROCODONE BITARTRATE AND ACETAMINOPHEN 1 TABLET: 7.5; 325 TABLET ORAL at 18:11

## 2019-11-27 RX ADMIN — DOCUSATE SODIUM 100 MG: 100 CAPSULE, LIQUID FILLED ORAL at 10:54

## 2019-11-27 RX ADMIN — FERROUS SULFATE TAB 325 MG (65 MG ELEMENTAL FE) 325 MG: 325 (65 FE) TAB at 18:07

## 2019-11-27 RX ADMIN — IBUPROFEN 800 MG: 800 TABLET ORAL at 10:54

## 2019-11-27 RX ADMIN — HYDROCODONE BITARTRATE AND ACETAMINOPHEN 1 TABLET: 5; 325 TABLET ORAL at 02:41

## 2019-11-27 RX ADMIN — Medication 1 TABLET: at 10:54

## 2019-11-27 NOTE — DISCHARGE SUMMARY
Obstetrical Discharge Summary     Name: Tana Galloway MRN: [id number]  SSN: [id number]    YOB: 1995  Age: 25 y.o. Sex: female      Admit Date: 2019    Discharge Date: 2019     Admitting Physician: Darryle Aquas, MD     Attending Physician:  Anuel Moulton DO     Admission Diagnoses: Pregnancy [Z34.90]    Discharge Diagnoses:   Information for the patient's :  Gregoria Mims, Female Anderson Feliciano [[id number]]   Delivery of a 2.756 kg female infant via , Low Transverse on 2019 at 6:23 PM  by Latanya Lopez. Apgars were 9  and 9 . Additional Diagnoses:   Hospital Problems  Date Reviewed: 2019          Codes Class Noted POA    Pregnancy ICD-10-CM: Z34.90  ICD-9-CM: V22.2  2019 Unknown             Lab Results   Component Value Date/Time    Rubella, External immune 2019    GrBStrep, External negative  2019       Immunization(s):   Immunization History   Administered Date(s) Administered    Influenza Vaccine (Quad) PF 2019    Tdap 10/25/2019        Hospital Course:   Patient is a 25 y.o. [id number] s/p repeat  LTCS at 38 weeks 0 days. Presented for  abdominal pain, contractions and loss of clear fluid . Pregnancy complicated by mild intermittent asthma, third trimester anemia and h/o  x2. Labor was uncomplicated. Delivered TLFI by Dr. Melinda Case and Dr. Nilsa Carolina without complications.  course complicated by PPH with EBL ~ 1000. Pt given Methergine x1- bleeding improved. On day of discharge patient reported minimal lochia, well controlled pain, and no other complaints. Discharged with pain regimen and bowel regimen. Will continue Iron TID until follow up. Advised to continue prenatal vitamins.   Follow up with OB/PCP in 2 weeks for incision check       Follow-up tests needed: CBC to follow up on anemia   Condition at Discharge:  Stable   Disposition: Discharge to Home    Physical exam:  Visit Vitals  /73 Pulse 83   Temp 98.3 °F (36.8 °C)   Resp 14   Ht 5' 1\" (1.549 m)   Wt 80.7 kg (178 lb)   LMP 2019   SpO2 100%   Breastfeeding Unknown   BMI 33.63 kg/m²       Exam:  Patient without distress. CTAB, no w/r/r/c               RRR, + S1 and S2, no m/r/g               Abdomen soft, fundus firm at level of umbilicus, non tender. Incision   clean dry and intact. Staples removed prior to discharge and steri Strips placed. Perineum with normal lochia noted. Lower extremities are negative for swelling, cords or tenderness. Patient Instructions:   Current Discharge Medication List      START taking these medications    Details   docusate sodium (COLACE) 100 mg capsule Take 1 Cap by mouth two (2) times a day for 90 days. Qty: 60 Cap, Refills: 2      HYDROcodone-acetaminophen (NORCO) 5-325 mg per tablet Take 1 Tab by mouth every eight (8) hours as needed for Pain for up to 2 days. Max Daily Amount: 3 Tabs. Qty: 5 Tab, Refills: 0    Associated Diagnoses: H/O:       ibuprofen (MOTRIN) 800 mg tablet Take 1 Tab by mouth every eight (8) hours as needed for Pain. Qty: 60 Tab, Refills: 0      modified lanolin (LANSINOH) 100 % cream Apply 1 Tube to affected area as needed for Other. Qty: 1 Tube, Refills: 5      nipple ointment (JACK MAHER'S FORMULA) Apply  to affected area three (3) times daily. Qty: 1 Tube, Refills: 5         CONTINUE these medications which have NOT CHANGED    Details   PNV No12-Iron-FA-DSS-OM-3 29 mg iron-1 mg -50 mg CPKD Take  by mouth. ferrous sulfate 325 mg (65 mg iron) tablet Take 1 Tab by mouth three (3) times daily (with meals). Qty: 90 Tab, Refills: 6    Associated Diagnoses: Pregnancy, unspecified gestational age             Reference my discharge instructions.     Follow-up Information     Follow up With Specialties Details Why Contact Miguel Angel Wick DO Family Practice Go on 2019 For wound re-check at 1:00PM Olga Maldonado [address] [facility]  [phone]      Donna Florentino MD Family Practice Go on 12/30/2019 6 week postpartum 10:00AM [address]  [phone]              Signed By:  Latoya Johnson DO    Family Medicine Resident

## 2019-11-27 NOTE — LACTATION NOTE
This note was copied from a baby's chart. Mom states baby has been nursing well. Mom to call with next feeding. Mom also giving formula because is worried she doesn't have enough milk yet. Discussed infants stomach size and supply and demand. Discussed anticipatory breast feeding discharge information in Nepali with mom. Also placed on discharge papers.

## 2019-11-27 NOTE — PROGRESS NOTES
Bedside and Verbal shift change report given to David Mart RN (oncoming nurse) by Ernestine Aponte RN (offgoing nurse). Report included the following information SBAR, Kardex, MAR and Recent Results.

## 2019-11-27 NOTE — LACTATION NOTE
This note was copied from a baby's chart. Discussed waking baby at least every three hours till baby returns to birth weight and should  attempt to feed. Pt will successfully establish breastfeeding by feeding in response to early feeding cues   or wake every 3h, will obtain deep latch, and will keep log of feedings/output. Taught to BF at hunger cues and or q 2-3 hrs and to offer 10-20 drops of hand expressed colostrum at any non-feeds. Breast Assessment  Left Breast: Medium  Left Nipple: Everted  Right Breast: Medium  Right Nipple: Intact  Breast- Feeding Assessment  Attends Breast-Feeding Classes: No  Breast-Feeding Experience: Yes(nursed first 3 gutierrez over 2 years)  Breast Trauma/Surgery: No  Type/Quality: Good  Lactation Consultant Visits  Breast-Feedings: Good   Mother/Infant Observation  Mother Observation: Alignment, Breast comfortable, Close hold, Holds breast, Lets baby end feeding, Nipple round on release  Infant Observation: (no latched achieved)  LATCH Documentation  Latch: Grasps breast, tongue down, lips flanged, rhythmic sucking  Audible Swallowing: Spontaneous and intermittent (24 hours old)  Type of Nipple: Everted (after stimulation)  Comfort (Breast/Nipple): Soft/non-tender  Hold (Positioning): No assist from staff, mother able to position/hold infant  LATCH Score: 10    Discussed breast feeding discharge information in Thai with mom. Breast Feeding Discharge Information    Chart shows numerous feedings, void, stool WNL. Discussed Importance of monitoring outputs and feedings on first week of  Breastfeeding. Discussed ways to tell if baby getting enough, ie  Voids and stools, by day 7, baby should have at least  4-6 wet diapers a day, change in color of stool to a seedy yellow, and return to birth wt within 2 weeks with a steady increase after that. .  Follow up with pediatrician visit for weight check in 1-2 days reviewed.       Discussed Breast feeding support groups and encouraged to call Warm line number, [phone]  for any breast feeding questions or problems that arise. Please leave a message and let us know what is going on. We will return your call within 24 hours. Breast feeding Support groups meet at [facility] the first and third Wednesday of the month from 11-12 noon. Meetings are held in a classroom past the cafeteria on the first floor. Feedings  Encouraged mom to attempt feeding with baby led feeding cues. Just as sucking on fingers, rooting, mouthing. Looking for 8-12 feedings in 24 hours. Don't limit baby at breast, allow baby to come off breast on it's own. Baby may want to feed  often and may increase number of feedings on second day of life. Skin to skin encouraged. In 4-6 weeks, baby may go though a growth spurt and increase feedings for several days to increase your milk supply. If baby doesn't nurse,  Mom should Pump or hand express drops, 12-18 drops, and give infant any expressed milk. If not pumping any milk, mom should contact pediatrician for possible need for supplementation. MOM's DIET    Discussed eating a healthy diet. Instructed mother to eat a variety of foods in order to get a well balanced diet. She should consume an extra 300-500 calories per day (more than her non-pregnant requirement.) These extra calories will help provide energy needed for optimal breast milk production. Mother also encouraged to \"drink to thirst\" and it is recommended that she drink fluids such as water and fruit/vegetable juice. Nutritious snacks should be available so that she can eat throughout the day to help satisfy her hunger and maintain a good milk supply. Continue taking your Prenatal vitamins as long as you breast feed. Engorgement Care Guidelines:  Anticipatory guidance shared. If breast become engorged, to help decrease engorgement. Frequent breastfeeding encouraged, cool packs around breast after nursing may help.   May take motrin or Ibuprofen as ordered by your Doctor.       Call your doctor, midwife and/or lactation consultant if:   Rody Mcdonnell is having no wet or dirty diapers    Baby has dark colored urine after day 3  (should be pale yellow to clear)    Baby has dark colored stools after day 4  (should be mustard yellow, with no meconium)    Baby has fewer wet/soiled diapers or nurses less   frequently than the goals listed here    Mom has symptoms of mastitis   (sore breast with fever, chills, flu-like aching)

## 2019-11-27 NOTE — PROGRESS NOTES
[address] [place] [address]   Office [phone], Fax [phone]                                 Post-Operative Day Number 2 Progress Note    Patient doing well post-op day 2 from  delivery without significant complaints. Pain well controlled. Lochia min. Tolerating regular diet. Ambulating. Voiding without difficulty. + flatus.  - BM. Vitals:  No data found. Temp (24hrs), Av.3 °F (36.8 °C), Min:97.8 °F (36.6 °C), Max:98.7 °F (37.1 °C)      Vital signs stable, afebrile. Exam:   Patient without distress               CTAB, no w/r/r/c    RRR, + S1 and S2, no m/r/g    Abdomen soft, fundus firm and below the umbilicus, non tender                 Incision c/d/i, appropriately tender               Lower extremities negative for swelling, no cords or tenderness    Lab/Data Review:  No results found for this or any previous visit (from the past 12 hour(s)). Assessment and Plan:    Riva Sicard is a 25 y.o.  s/p rLTCS at 38 weeks 0 days. Pregnancy was complicated by anemia. Patient appears to be having uncomplicated post- course.       1. UOP 1780cc in 24 hrs, piña removed last night  2. Oral hydration, regular diet  3. EBL: 1000cc. S/p methergine once POD0. Iron TID - will provide Rx for discharge as well   4. Continue routine post-op care and maternal education. 5. Plan discharge tomorrow if no problems occur. Patient discussed with Dr. Alba Krishna [facility] attending).     Jose Novak MD  Family Medicine Resident

## 2019-11-27 NOTE — PROGRESS NOTES
Bedside shift change report given to Chaim Serrano RN (oncoming nurse) by Eric Ansari RN (offgoing nurse). Report included the following information SBAR, Kardex, Intake/Output and MAR.

## 2019-11-27 NOTE — PROGRESS NOTES
Bedside shift change report given to HIRAL Pitts RN (oncoming nurse) by ARDEN Soares RN (offgoing nurse). Report included the following information Intake/Output, MAR, Recent Results and Med Rec Status.

## 2019-11-28 VITALS
SYSTOLIC BLOOD PRESSURE: 113 MMHG | OXYGEN SATURATION: 100 % | BODY MASS INDEX: 33.61 KG/M2 | HEIGHT: 61 IN | RESPIRATION RATE: 14 BRPM | TEMPERATURE: 98.3 F | HEART RATE: 83 BPM | DIASTOLIC BLOOD PRESSURE: 73 MMHG | WEIGHT: 178 LBS

## 2019-11-28 PROCEDURE — 74011250637 HC RX REV CODE- 250/637: Performed by: FAMILY MEDICINE

## 2019-11-28 RX ADMIN — FERROUS SULFATE TAB 325 MG (65 MG ELEMENTAL FE) 325 MG: 325 (65 FE) TAB at 08:44

## 2019-11-28 RX ADMIN — DOCUSATE SODIUM 100 MG: 100 CAPSULE, LIQUID FILLED ORAL at 08:44

## 2019-11-28 RX ADMIN — MUPIROCIN: 20 OINTMENT TOPICAL at 08:44

## 2019-11-28 RX ADMIN — Medication 1 TABLET: at 08:44

## 2019-11-28 NOTE — DISCHARGE INSTRUCTIONS
Patient Education        Parto por cesárea: [place] Ken[place] en el hogar - [  Section: What to Expect at [facility] ]  Hopper recuperación    Un parto por cesárea, o simplemente cesárea, es alicia cirugía mediante la cual se da a ray a un bebé a través de un lo, llamado incisión, que hace el médico en la parte baja del abdomen y [place]. Es posible que sienta dolor en la parte baja del abdomen y que necesite analgésicos (medicamentos para el dolor) toribio 1 o 2 semanas. Puede esperar tener algo de sangrado vaginal toribio varias semanas. Es probable que necesite unas 6 semanas para recuperarse completamente. Es importante que se tome las cosas con calma mientras shyann la incisión. Evite levantar objetos pesados, hacer actividades vigorosas o hacer ejercicios que pudieran esforzar los músculos abdominales mientras se recupera. Pídale a un familiar o amigo que la ayude con las tareas domésticas, la comida y las compras. Esta hoja de [employer] idea general del tiempo que le llevará recuperarse. Sin embargo, cada persona se recupera a un ritmo diferente. Siga los pasos que se mencionan a continuación para recuperarse lo más rápido posible. ¿Cómo puede cuidarse en el \[facility]\""? Actividad    · Descanse cuando se sienta cansada. Dormir lo suficiente la ayudará a recuperarse.     · Intente caminar todos los días. Comience caminando un poco más de lo que caminó el día anterior. Poco a poco, aumente la distancia. Caminar mejora el flujo de ricci y [place] a prevenir la neumonía, el estreñimiento y los coágulos de ricci.     · Evite las actividades intensas, michelle montar en [place], trotar, levantar pesas y hacer ejercicios aeróbicos toribio 6 semanas o hasta que hopper médico lo apruebe.     · Hasta que hopper médico lo apruebe, no levante nada más pesado que hopper bebé.      · No morales abdominales ni ningún otro ejercicio que [employer] músculos del abdomen toribio 6 semanas o hasta que hopper médico lo apruebe.     · Damien Bray almohada sobre la incisión al toser o respirar profundamente. [facility] apoyo a haque abdomen y reducirá el dolor.     · Puede ducharse michelle de costumbre. Seque la incisión con toques suaves de toalla cuando termine.     · Tendrá algo de sangrado vaginal. Use toallas sanitarias. No se morales lavados vaginales ni use tampones hasta que el médico se lo permita.     · Pregúntele a haque médico cuándo puede volver a conducir.     · Es probable que necesite ausentarse del trabajo por lo menos 6 semanas. [place] dependerá del tipo de trabajo que morales y de cómo se sienta.     · Pregúntele a haque médico cuándo puede tener relaciones sexuales. Alimentación    · Puede continuar con haque dieta normal. Si tiene malestar estomacal, coma alimentos suaves bajos en grasa, michelle arroz sin condimentar, rod a la bernice, pan suri y yogur.     · Kathe abundantes líquidos (a menos que haque médico le indique lo contrario).     · Podría notar que no evacua el intestino con regularidad annelise después de la cirugía. [place] es común. Trate de evitar el estreñimiento y no hacer esfuerzos cuando evacua el intestino. Terry vez desee emilie un suplemento de [employer]. Si no ha evacuado el intestino después de un par de días, pregúntele a haque médico si puede emilie un laxante suave.     · Si está amamantando, limite el alcohol. El alcohol puede causar falta de energía y otros problemas de kaley para el bebé cuando alicia annalee que [place]. [place] ser un obstáculo en la capacidad de alicia mamá para alimentar a haque bebé o para cuidarlo en otros aspectos. No hay mucha investigación sobre qué cantidad exacta de alcohol puede ser perjudicial para un bebé. No consumir alcohol es la opción más hester para haque bebé. Si opta por beber [place] bebida alcohólica de vez en cuando, solo tome alicia bebida y limite las ocasiones en que kathe alcohol.  Después de beber alcohol, espere al menos 2 horas antes de amamantar para reducir la cantidad de alcohol que el bebé pueda recibir a través de [address]. Medicamentos    · Haque médico le dirá si puede volver a emilie sahil medicamentos y cuándo puede volver a hacerlo. También le dará indicaciones sobre cualquier medicamento nuevo que deba emilie usted.     · Si mt medicamentos que previenen la formación de coágulos de [place], michelle warfarina (Coumadin), clopidogrel (Plavix) o aspirina, asegúrese de hablar con haque médico. Él o abdulkadir le dirá si debe volver a emilie estos medicamentos y en qué momento. Asegúrese de que entiende exactamente lo que el médico quiere que morales.     · [place] los analgésicos (medicamentos para el dolor) exactamente michelle le fueron indicados. ? Si el médico le recetó un analgésico, tómelo según las indicaciones. ? Si no está tomando un analgésico recetado, pregúntele a haque médico si puede emilie vida de [place].     · Si le parece que el analgésico le está produciendo malestar estomacal:  ? [place] el medicamento después de las comidas (a menos que haque médico le haya indicado lo contrario). ? Pídale al médico un analgésico diferente.     · Si haque médico le recetó antibióticos, tómelos según las indicaciones. No deje de tomarlos por el hecho de sentirse mejor. Debe emilie todos los antibióticos hasta terminarlos. Cuidado de la incisión    · Si tiene tiras de cinta [employer] incisión, déjeselas puestas toribio alicia semana o hasta que se caigan por sí solas.     · Lave la rich a diario con agua jabonosa tibia y séquela con toques suaves de toalla. No use peróxido de hidrógeno [place]) o alcohol porque pueden retrasar la sanación. Podría cubrir la rich con alicia venda de gasa si supura o roza contra la ropa. Cambie la venda todos los panfilo.     · Mantenga la rich limpia y [place] Pafos. Otras instrucciones    · Si amamanta a haque bebé, kristin vez le resulte más cómodo, toribio el proceso de sanación, sostener al bebé de [place] [employer] en la que no se apoye en haque abdomen.  Intente poner a haque bebé debajo del brazo, con el cuerpo del lado que lo Linda Brome a amamantar. Sostenga la parte superior del cuerpo de haque bebé con haque [place]. Con adam mano usted puede controlar la lorne de haque bebé para acercarle la boca a haque seno. [place] se conoce a veces michelle \"posición de balón de fútbol americano\". La atención de seguimiento es alicia parte clave de haque tratamiento y seguridad. Asegúrese de hacer y acudir a todas las citas, y llame a haque médico si está teniendo problemas. También es alicia buena idea saber los resultados de sahil exámenes y mantener alicia lista de los medicamentos que mt. ¿Cuándo debe pedir ayuda? Llame al 911 en cualquier momento que considere que necesita atención de [place]. Por ejemplo, llame si:    · Tiene pensamientos de herirse a sí misma, hacerle daño a haque bebé o a otra persona.     · Se desmayó (perdió el conocimiento).     · Tiene dolor en el pecho, le falta el aire o tose ricci.     · Tiene convulsiones.    Llame a haque médico ahora mismo o busque atención médica inmediata si:    · Tiene dolor que no mejora después de emilie un analgésico (medicamento para el dolor).     · Tiene sangrado vaginal intenso.     · Está mareada o aturdida, o siente michelle si estuviera por desmayarse.     · Tiene un dolor nuevo o peor en el abdomen o la pelvis.     · Tiene puntos de sutura flojos o se le abre la incisión.     · Tiene síntomas de infección, michelle:  ? Aumento del dolor, la hinchazón, la temperatura o el enrojecimiento. ? Vetas rojizas que salen de la incisión. ? Pus que sale de la incisión. ? [place].     · Tiene síntomas de un coágulo de ricci en la pierna (que se llama trombosis venosa profunda), michelle:  ? Dolor en la pantorrilla, el muslo, la juan a o detrás de la rodilla. ? Enrojecimiento e hinchazón en la pierna o la juan a.     · Tiene señales de preeclampsia, michelle:  ? Hinchazón repentina de la lizbet, las waldemar o los pies. ? Nuevos problemas de visión (michelle oscurecimiento, caludia borroso o claudia puntos). ? Dolor de lorne intenso.  Preste especial atención a los cambios en haque kaley y asegúrese de comunicarse con haque médico si:    · No mejora michelle se esperaba. ¿Dónde puede encontrar más información en inglés? Marianna Presto a [email or web address] Elyssa Melgar O489 en la búsqueda para aprender más acerca de \"Parto por cesárea: Karyn Pengito en el [facility] - [  Section: What to Expect at [facility] ]. \"  Revisado: 2019  Versión del contenido: 12.2  © 4772-8826 Healthwise, Incorporated. Las instrucciones de cuidado fueron adaptadas bajo licencia por [employer] (which disclaims liability or warranty for this information). Si usted tiene [place] afección médica o sobre estas instrucciones, siempre pregunte a haque profesional de kaley. Healthwise, Incorporated niega toda garantía o responsabilidad por haque uso de esta información. Patient Discharge Instructions    Geoffrey Anastacia / [id number] : 1995    Admitted 2019 Discharged: 2019       Please bring this form with you to show your care provider at your follow-up appointment.     Primary care provider:  None    Discharging provider:  Latoya Johnson DO  - Family Medicine Resident  Dr. Diaz [facility] hospitalist          ACUTE DIAGNOSES:  Pregnancy [Z34.90]      FOLLOW-UP CARE RECOMMENDATIONS:    Follow-up Information     Follow up With Specialties Details Why Contact Info    Joanna Renee DO Family Practice Go on 2019 For wound re-check at 1:00PM [address]  [phone]      Donna Florentino MD Family Practice Go on 2019 6 week postpartum 10:00AM [address]  [phone]            Continuing Therapy:  - Please continue Motrin 800 mg, 1 tablet every 8 hours for the next 2 days, then 1 tablet every 8 hours as needed for pain  - Please continue Norco 5-325 mg, Take 1 tablet every 8 hours as needed for breakthrough pain  - Please continue Colace 100 mg for constipation, take one tablet BID until having regular bowel movements  - Please start Ferrous Sulfate 325 mg for anemia, Take 1 tablet 3 times daily with Orange juice  - Please continue your prenatal vitamins  - Please apply lanolin and Paredes's ointment on nipples as needed for soreness      Follow-up tests needed: CBC    Pending test results: At the time of your discharge the following test results are still pending: none. Please make sure you review these results with your outpatient follow-up provider(s). Specific symptoms to watch for: chest pain, shortness of breath, fever, chills, nausea, vomiting, diarrhea, change in mentation, falling, weakness, bleeding. DIET/what to eat:  Regular Diet    ACTIVITY:   Activity Instructions    Do not lift anything heavier than your baby for 6 weeks. Nothing in the vagina for 6 weeks. After having a /vaginal delivery you will still need to wait about six weeks before having sex. You will have your six-week postpartum check-up at this time. You are ok to drive as long as you are not taking Percocet or other narcotic pain medication (Motrin is ok). Wound care:   PLEASE KEEP YOUR INCISION CLEAN AND DRY. YOU CAN SHOWER NORMALLY. DO NOT SCRUB YOUR INCISION,  AND INSTEAD JUST LET THE WATER AND SOAP RUN OVER IT.  YOU CAN REMOVE THE SMALL WHITE BANDAGES IN 1 WEEK. IT IS EASIEST TO DO THIS IN THE SHOWER. CALL THE OFFICE IF YOU SEE ANY SIGNS OF INFECTION OR THE WOUND OPENING BACK UP      I understand that if any problems occur once I am at home I am to contact my physician. These instructions were explained to me and I had the opportunity to ask questions. I understand and acknowledge receipt of the instructions indicated above.                                                                                                                                                Physician's or R.N.'s Signature Date/Time                                                                                                                                              Patient or Representative Signature                                                          Date/Time      Patient Education        Parto por cesárea: Calixto Valverde en el [facility] - [  Section: What to Expect at [facility] ]  Hopper recuperación    Un parto por cesárea, o simplemente cesárea, es alicia cirugía mediante la cual se da a ray a un bebé a través de un lo, llamado incisión, que hace el médico en la parte baja del abdomen y [place]. Es posible que sienta dolor en la parte baja del abdomen y que necesite analgésicos (medicamentos para el dolor) toribio 1 o 2 semanas. Puede esperar tener algo de sangrado vaginal toribio varias semanas. Es probable que necesite unas 6 semanas para recuperarse completamente. Es importante que se tome las cosas con calma mientras shyann la incisión. Evite levantar objetos pesados, hacer actividades vigorosas o hacer ejercicios que pudieran esforzar los músculos abdominales mientras se recupera. Pídale a un familiar o amigo que la ayude con las tareas domésticas, la comida y las compras. Esta hoja de [employer] idea general del tiempo que le llevará recuperarse. Sin embargo, cada persona se recupera a un ritmo diferente. Siga los pasos que se mencionan a continuación para recuperarse lo más rápido posible. ¿Cómo puede cuidarse en el [facility]? Actividad    · Descanse cuando se sienta cansada. Dormir lo suficiente la ayudará a recuperarse.     · Intente caminar todos los días. Comience caminando un poco más de lo que caminó el día anterior. Poco a poco, aumente la distancia.  Caminar mejora el flujo de ricci y Brayden Messina a prevenir la neumonía, el estreñimiento y los coágulos de ricci.     · Evite las actividades intensas, michelle montar en [place], trotar, levantar pesas y hacer ejercicios aeróbicos toribio 6 semanas o hasta que haque médico lo apruebe.     · Hasta que haque médico lo apruebe, no levante nada más pesado que haque bebé.   · No morales abdominales ni ningún otro ejercicio que [employer] músculos del abdomen toribio 6 semanas o hasta que haque médico lo apruebe.     · Sostenga alicia almohada sobre la incisión al toser o respirar profundamente. Auremicheline RamosJanett apoyo a haque abdomen y reducirá el dolor.     · Puede ducharse michelle de costumbre. Seque la incisión con toques suaves de toalla cuando termine.     · Tendrá algo de sangrado vaginal. Use toallas sanitarias. No se morales lavados vaginales ni use tampones hasta que el médico se lo permita.     · Pregúntele a haque médico cuándo puede volver a conducir.     · Es probable que necesite ausentarse del trabajo por lo menos 6 semanas. [place] dependerá del tipo de trabajo que morales y de cómo se sienta.     · Pregúntele a haque médico cuándo puede tener relaciones sexuales. Alimentación    · Puede continuar con haque dieta normal. Si tiene malestar estomacal, coma alimentos suaves bajos en grasa, michelle arroz sin condimentar, rod a la bernice, pan suri y yogur.     · [place] abundantes líquidos (a menos que haque médico le indique lo contrario).     · Podría notar que no evacua el intestino con regularidad annelise después de la cirugía. [place] es común. Trate de evitar el estreñimiento y no hacer esfuerzos cuando evacua el intestino. Terry vez desee emilie un suplemento de [employer]. Si no ha evacuado el intestino después de un par de días, pregúntele a haque médico si puede emilie un laxante suave.     · Si está amamantando, limite el alcohol. El alcohol puede causar falta de energía y otros problemas de kaley para el bebé cuando alicia annalee que [place]. [employer] ser un obstáculo en la capacidad de alicia mamá para alimentar a haque bebé o para cuidarlo en otros aspectos. No hay mucha investigación sobre qué cantidad exacta de alcohol puede ser perjudicial para un bebé.  No consumir alcohol es la opción más hester para haque bebé. Si opta por beber [place] bebida alcohólica de vez en cuando, solo tome alicia bebida y limite las ocasiones en que johnny alcohol. Después de beber alcohol, espere al menos 2 horas antes de amamantar para reducir la cantidad de alcohol que el bebé pueda recibir a través de la [place]. Medicamentos    · Haque médico le dirá si puede volver a emilie sahil medicamentos y cuándo puede volver a hacerlo. También le dará indicaciones sobre cualquier medicamento nuevo que deba emilie usted.     · Si mt medicamentos que previenen la formación de coágulos de [place], michelle warfarina (Coumadin), clopidogrel (Plavix) o aspirina, asegúrese de hablar con haque médico. Él o abdulkadir le dirá si debe volver a emilie estos medicamentos y en qué momento. Asegúrese de que entiende exactamente lo que el médico quiere que morales.     · [place] los analgésicos (medicamentos para el dolor) exactamente michelle le fueron indicados. ? Si el médico le recetó un analgésico, tómelo según las indicaciones. ? Si no está tomando un analgésico recetado, pregúntele a haque médico si puede emilie vida de [place].     · Si le parece que el analgésico le está produciendo malestar estomacal:  ? [place] el medicamento después de las comidas (a menos que haque médico le haya indicado lo contrario). ? [place]ídale al médico un analgésico diferente.     · Si hqaue médico le recetó antibióticos, tómelos según las indicaciones. No deje de tomarlos por el hecho de sentirse mejor. Debe emilie todos los antibióticos hasta terminarlos. Cuidado de la incisión    · Si tiene tiras de cinta [employer] incisión, déjeselas puestas toribio alicia semana o hasta que se caigan por sí solas.     · Lave la rich a diario con agua jabonosa tibia y séquela con toques suaves de toalla. No use peróxido de hidrógeno [place]) o alcohol porque pueden retrasar la sanación. Podría cubrir la rich con alicia venda de gasa si supura o roza contra la ropa.  Cambie la [employer] panfilo.     · Mantenga la rich limpia y seca. Otras instrucciones    · Si amamanta a haque bebé, kristin vez le resulte más cómodo, toribio el proceso de sanación, sostener al bebé de [place] en la que no se apoye en haque abdomen. Intente poner a haque bebé debajo del brazo, con el cuerpo del lado que lo Irma Kerrie a amamantar. Sostenga la parte superior del cuerpo de haque bebé con haque [place]. Con adam mano usted puede controlar la lorne de haque bebé para acercarle la boca a haque seno. [place] se conoce a veces michelle \"posición de balón de fútbol americano\". La atención de seguimiento es alicia parte clave de haque tratamiento y seguridad. Asegúrese de hacer y acudir a todas las citas, y llame a haque médico si está teniendo problemas. También es alicia buena idea saber los resultados de sahil exámenes y mantener alicia lista de los medicamentos que mt. ¿Cuándo debe pedir ayuda? Llame al 911 en cualquier momento que considere que necesita atención de [place]. Por ejemplo, llame si:    · Tiene pensamientos de herirse a sí misma, hacerle daño a haque bebé o a otra persona.     · Se desmayó (perdió el conocimiento).     · Tiene dolor en el pecho, le falta el aire o tose ricci.     · Tiene convulsiones.    Llame a haque médico ahora mismo o busque atención médica inmediata si:    · Tiene dolor que no mejora después de emilie un analgésico (medicamento para el dolor).     · Tiene sangrado vaginal intenso.     · Está mareada o aturdida, o siente michelle si estuviera por desmayarse.     · Tiene un dolor nuevo o peor en el abdomen o la pelvis.     · Tiene puntos de sutura flojos o se le abre la incisión.     · Tiene síntomas de infección, michelle:  ? Aumento del dolor, la hinchazón, la temperatura o el enrojecimiento. ? Vetas rojizas que salen de la incisión. ? Pus que sale de la incisión.   ? Edra Balsam.     · Tiene síntomas de un coágulo de ricci en la pierna (que se llama trombosis venosa profunda), michelle:  ? Dolor en la pantorrilla, el muslo, la juan a o detrás de la leo. ? Enrojecimiento e hinchazón en la pierna o la juan a.     · Tiene señales de preeclampsia, michelle:  ? Hinchazón repentina de la lizbet, las waldemar o los pies. ? Nuevos problemas de visión (michelle oscurecimiento, claudia borroso o claudia puntos). ? Dolor de lorne intenso.    Preste especial atención a los cambios en haque kaley y asegúrese de comunicarse con ahque médico si:    · No mejora michelle se esperaba. ¿Dónde puede encontrar más información en inglés? Cory Imam a [email or web address] Kandice Schmid [phone] en la búsqueda para aprender más acerca de \"Parto por cesárea: Leopold Martin en el [facility] - [  Section: What to Expect at [facility] ]. \"  Revisado: 29 [place], 2019  Versión del contenido: 12.2  © 4157-3011 Healthwise, Incorporated. Las instrucciones de cuidado fueron adaptadas bajo licencia por [employer] (which disclaims liability or warranty for this information). Si usted tiene [place] [employer] afección médica o sobre estas instrucciones, siempre pregunte a haque profesional de kaley. Healthwise, Incorporated niega toda garantía o responsabilidad por haque uso de esta información.

## 2019-11-28 NOTE — PROGRESS NOTES
0700 Bedside SBAR report received from JYOTHI Herrera RN.  2968 Assessment complete; This RN ordered patient's breakfast. Perineal supplies given. Communication via google translate per patient request at this time. 0314 Dr. Enoch Oconnor at patient bedside removing staples from incision and applying mastisol and steristrips. [id number]  All discharge teaching complete with patient and father of baby via [employer]i language line;  # [id number];LBVO verbalize understanding; all questions answered. Prescriptions for lanolin, Norco, Motrin , Gayl Man and cColace given along with all medication administration instructions. Patient verbalizes understanding to keep followup post partum appointment for 12/9/19 at 1330. Patient's mother and father of baby will main support at home. 1300 Patient taken downstairs via wheelchair escorted by this RN.

## 2019-11-28 NOTE — ROUTINE PROCESS
Bedside shift change report given to Ricarda Vega RN (oncoming nurse) by Chinmay Fox RN (offgoing nurse). Report included the following information SBAR, Kardex and MAR.

## (undated) DEVICE — SOLIDIFIER FLUID 3000 CC ABSORB

## (undated) DEVICE — BLADE ASSEMB CLP HAIR FINE --

## (undated) DEVICE — GOWN,AURORA,FABRIC-REINFORCED,X-LARGE: Brand: MEDLINE

## (undated) DEVICE — BULB SYRINGE, IRRIGATION WITH PROTECTIVE CAP, 60 CC, INDIVIDUALLY WRAPPED: Brand: DOVER

## (undated) DEVICE — REM POLYHESIVE ADULT PATIENT RETURN ELECTRODE: Brand: VALLEYLAB

## (undated) DEVICE — AGENT HEMSTAT 3GM PURIFIED PLNT STARCH PWD ABSRB ARISTA AH

## (undated) DEVICE — X-RAY DETECTABLE SPONGES,16 PLY: Brand: VISTEC

## (undated) DEVICE — SOLUTION IV 1000ML 0.9% SOD CHL

## (undated) DEVICE — C-SECTION II-LF: Brand: MEDLINE INDUSTRIES, INC.

## (undated) DEVICE — SLEEVE COMPR STD 12 IN FOR 165IN CALF COMFORT VENODYNE SYS

## (undated) DEVICE — HANDLE LT SNAP ON ULT DURABLE LENS FOR TRUMPF ALC DISPOSABLE

## (undated) DEVICE — SOLUTION IRRIG 1000ML H2O STRL BLT

## (undated) DEVICE — SUTURE CHROMIC GUT SZ 1 L36IN ABSRB BRN L40MM CT 1/2 CIR 915H

## (undated) DEVICE — STERILE LATEX POWDER-FREE SURGICAL GLOVESWITH NITRILE COATING: Brand: PROTEXIS

## (undated) DEVICE — TIP CLEANER: Brand: VALLEYLAB

## (undated) DEVICE — STAPLER SKIN H3.9MM WIRE DIA0.58MM CRWN 6.9MM 35 STPL ROT

## (undated) DEVICE — 3000CC GUARDIAN II: Brand: GUARDIAN

## (undated) DEVICE — TELFA ADHESIVE ISLAND DRESSING: Brand: TELFA

## (undated) DEVICE — STERILE POLYISOPRENE POWDER-FREE SURGICAL GLOVES: Brand: PROTEXIS

## (undated) DEVICE — (D)SOL MEDC ALC ISO 70% 16OZ -- CONVERT TO ITEM 364515

## (undated) DEVICE — SPONGE LAP 18X18IN STRL -- 5/PK

## (undated) DEVICE — TRAY CATH OD16FR SIL URIN M STATLOK STBL DEV SURSTP

## (undated) DEVICE — BLADE SURG UNIV BLUE --

## (undated) DEVICE — SUTURE STRATAFIX SYMMETRIC PDS + SZ 1 L18IN ABSRB VLT L48MM SXPP1A400

## (undated) DEVICE — 3M™ IOBAN™ 2 ANTIMICROBIAL INCISE DRAPE 6648EZ: Brand: IOBAN™ 2

## (undated) DEVICE — ROCKER SWITCH PENCIL HOLSTER: Brand: VALLEYLAB

## (undated) DEVICE — TOWEL,OR,DSP,ST,BLUE,STD,2/PK,40PK/CS: Brand: MEDLINE